# Patient Record
Sex: MALE | Race: BLACK OR AFRICAN AMERICAN | NOT HISPANIC OR LATINO | URBAN - METROPOLITAN AREA
[De-identification: names, ages, dates, MRNs, and addresses within clinical notes are randomized per-mention and may not be internally consistent; named-entity substitution may affect disease eponyms.]

---

## 2022-01-18 ENCOUNTER — EMERGENCY (EMERGENCY)
Facility: HOSPITAL | Age: 42
LOS: 1 days | Discharge: HOME | End: 2022-01-18
Attending: STUDENT IN AN ORGANIZED HEALTH CARE EDUCATION/TRAINING PROGRAM | Admitting: STUDENT IN AN ORGANIZED HEALTH CARE EDUCATION/TRAINING PROGRAM
Payer: MEDICAID

## 2022-01-18 VITALS
OXYGEN SATURATION: 100 % | RESPIRATION RATE: 18 BRPM | DIASTOLIC BLOOD PRESSURE: 133 MMHG | HEART RATE: 139 BPM | TEMPERATURE: 97 F | SYSTOLIC BLOOD PRESSURE: 188 MMHG

## 2022-01-18 VITALS
HEART RATE: 104 BPM | DIASTOLIC BLOOD PRESSURE: 119 MMHG | RESPIRATION RATE: 18 BRPM | OXYGEN SATURATION: 100 % | SYSTOLIC BLOOD PRESSURE: 163 MMHG

## 2022-01-18 LAB
ALBUMIN SERPL ELPH-MCNC: 4.5 G/DL — SIGNIFICANT CHANGE UP (ref 3.3–5)
ALBUMIN SERPL ELPH-MCNC: 4.6 G/DL — SIGNIFICANT CHANGE UP (ref 3.3–5)
ALP SERPL-CCNC: 91 U/L — SIGNIFICANT CHANGE UP (ref 40–120)
ALP SERPL-CCNC: 95 U/L — SIGNIFICANT CHANGE UP (ref 40–120)
ALT FLD-CCNC: 15 U/L — SIGNIFICANT CHANGE UP (ref 4–41)
ALT FLD-CCNC: 16 U/L — SIGNIFICANT CHANGE UP (ref 4–41)
ANION GAP SERPL CALC-SCNC: 10 MMOL/L — SIGNIFICANT CHANGE UP (ref 7–14)
ANION GAP SERPL CALC-SCNC: 12 MMOL/L — SIGNIFICANT CHANGE UP (ref 7–14)
ANION GAP SERPL CALC-SCNC: 8 MMOL/L — SIGNIFICANT CHANGE UP (ref 7–14)
AST SERPL-CCNC: 12 U/L — SIGNIFICANT CHANGE UP (ref 4–40)
AST SERPL-CCNC: 13 U/L — SIGNIFICANT CHANGE UP (ref 4–40)
BASOPHILS # BLD AUTO: 0.02 K/UL — SIGNIFICANT CHANGE UP (ref 0–0.2)
BASOPHILS NFR BLD AUTO: 0.4 % — SIGNIFICANT CHANGE UP (ref 0–2)
BILIRUB SERPL-MCNC: 0.7 MG/DL — SIGNIFICANT CHANGE UP (ref 0.2–1.2)
BILIRUB SERPL-MCNC: 0.8 MG/DL — SIGNIFICANT CHANGE UP (ref 0.2–1.2)
BUN SERPL-MCNC: 11 MG/DL — SIGNIFICANT CHANGE UP (ref 7–23)
BUN SERPL-MCNC: 11 MG/DL — SIGNIFICANT CHANGE UP (ref 7–23)
BUN SERPL-MCNC: 12 MG/DL — SIGNIFICANT CHANGE UP (ref 7–23)
CALCIUM SERPL-MCNC: 8.9 MG/DL — SIGNIFICANT CHANGE UP (ref 8.4–10.5)
CALCIUM SERPL-MCNC: 9.1 MG/DL — SIGNIFICANT CHANGE UP (ref 8.4–10.5)
CALCIUM SERPL-MCNC: 9.3 MG/DL — SIGNIFICANT CHANGE UP (ref 8.4–10.5)
CHLORIDE SERPL-SCNC: 100 MMOL/L — SIGNIFICANT CHANGE UP (ref 98–107)
CHLORIDE SERPL-SCNC: 97 MMOL/L — LOW (ref 98–107)
CHLORIDE SERPL-SCNC: 98 MMOL/L — SIGNIFICANT CHANGE UP (ref 98–107)
CO2 SERPL-SCNC: 27 MMOL/L — SIGNIFICANT CHANGE UP (ref 22–31)
CO2 SERPL-SCNC: 28 MMOL/L — SIGNIFICANT CHANGE UP (ref 22–31)
CO2 SERPL-SCNC: 30 MMOL/L — SIGNIFICANT CHANGE UP (ref 22–31)
CREAT SERPL-MCNC: 0.99 MG/DL — SIGNIFICANT CHANGE UP (ref 0.5–1.3)
CREAT SERPL-MCNC: 1.01 MG/DL — SIGNIFICANT CHANGE UP (ref 0.5–1.3)
CREAT SERPL-MCNC: 1.07 MG/DL — SIGNIFICANT CHANGE UP (ref 0.5–1.3)
EOSINOPHIL # BLD AUTO: 0.02 K/UL — SIGNIFICANT CHANGE UP (ref 0–0.5)
EOSINOPHIL NFR BLD AUTO: 0.4 % — SIGNIFICANT CHANGE UP (ref 0–6)
GLUCOSE SERPL-MCNC: 196 MG/DL — HIGH (ref 70–99)
GLUCOSE SERPL-MCNC: 224 MG/DL — HIGH (ref 70–99)
GLUCOSE SERPL-MCNC: 248 MG/DL — HIGH (ref 70–99)
HCT VFR BLD CALC: 45.7 % — SIGNIFICANT CHANGE UP (ref 39–50)
HGB BLD-MCNC: 15.5 G/DL — SIGNIFICANT CHANGE UP (ref 13–17)
IANC: 2.54 K/UL — SIGNIFICANT CHANGE UP (ref 1.5–8.5)
IMM GRANULOCYTES NFR BLD AUTO: 0.2 % — SIGNIFICANT CHANGE UP (ref 0–1.5)
LYMPHOCYTES # BLD AUTO: 2.24 K/UL — SIGNIFICANT CHANGE UP (ref 1–3.3)
LYMPHOCYTES # BLD AUTO: 43.4 % — SIGNIFICANT CHANGE UP (ref 13–44)
MAGNESIUM SERPL-MCNC: 1.7 MG/DL — SIGNIFICANT CHANGE UP (ref 1.6–2.6)
MAGNESIUM SERPL-MCNC: 2.1 MG/DL — SIGNIFICANT CHANGE UP (ref 1.6–2.6)
MCHC RBC-ENTMCNC: 29.8 PG — SIGNIFICANT CHANGE UP (ref 27–34)
MCHC RBC-ENTMCNC: 33.9 GM/DL — SIGNIFICANT CHANGE UP (ref 32–36)
MCV RBC AUTO: 87.9 FL — SIGNIFICANT CHANGE UP (ref 80–100)
MONOCYTES # BLD AUTO: 0.33 K/UL — SIGNIFICANT CHANGE UP (ref 0–0.9)
MONOCYTES NFR BLD AUTO: 6.4 % — SIGNIFICANT CHANGE UP (ref 2–14)
NEUTROPHILS # BLD AUTO: 2.54 K/UL — SIGNIFICANT CHANGE UP (ref 1.8–7.4)
NEUTROPHILS NFR BLD AUTO: 49.2 % — SIGNIFICANT CHANGE UP (ref 43–77)
NRBC # BLD: 0 /100 WBCS — SIGNIFICANT CHANGE UP
NRBC # FLD: 0 K/UL — SIGNIFICANT CHANGE UP
PHOSPHATE SERPL-MCNC: 2.1 MG/DL — LOW (ref 2.5–4.5)
PHOSPHATE SERPL-MCNC: 2.5 MG/DL — SIGNIFICANT CHANGE UP (ref 2.5–4.5)
PLATELET # BLD AUTO: 236 K/UL — SIGNIFICANT CHANGE UP (ref 150–400)
POTASSIUM SERPL-MCNC: 2.7 MMOL/L — CRITICAL LOW (ref 3.5–5.3)
POTASSIUM SERPL-MCNC: 2.7 MMOL/L — CRITICAL LOW (ref 3.5–5.3)
POTASSIUM SERPL-MCNC: 3.1 MMOL/L — LOW (ref 3.5–5.3)
POTASSIUM SERPL-SCNC: 2.7 MMOL/L — CRITICAL LOW (ref 3.5–5.3)
POTASSIUM SERPL-SCNC: 2.7 MMOL/L — CRITICAL LOW (ref 3.5–5.3)
POTASSIUM SERPL-SCNC: 3.1 MMOL/L — LOW (ref 3.5–5.3)
PROT SERPL-MCNC: 7.3 G/DL — SIGNIFICANT CHANGE UP (ref 6–8.3)
PROT SERPL-MCNC: 7.6 G/DL — SIGNIFICANT CHANGE UP (ref 6–8.3)
RBC # BLD: 5.2 M/UL — SIGNIFICANT CHANGE UP (ref 4.2–5.8)
RBC # FLD: 12 % — SIGNIFICANT CHANGE UP (ref 10.3–14.5)
SARS-COV-2 RNA SPEC QL NAA+PROBE: SIGNIFICANT CHANGE UP
SODIUM SERPL-SCNC: 136 MMOL/L — SIGNIFICANT CHANGE UP (ref 135–145)
SODIUM SERPL-SCNC: 136 MMOL/L — SIGNIFICANT CHANGE UP (ref 135–145)
SODIUM SERPL-SCNC: 138 MMOL/L — SIGNIFICANT CHANGE UP (ref 135–145)
WBC # BLD: 5.16 K/UL — SIGNIFICANT CHANGE UP (ref 3.8–10.5)
WBC # FLD AUTO: 5.16 K/UL — SIGNIFICANT CHANGE UP (ref 3.8–10.5)

## 2022-01-18 PROCEDURE — 99284 EMERGENCY DEPT VISIT MOD MDM: CPT

## 2022-01-18 RX ORDER — LOSARTAN POTASSIUM 100 MG/1
100 TABLET, FILM COATED ORAL ONCE
Refills: 0 | Status: COMPLETED | OUTPATIENT
Start: 2022-01-18 | End: 2022-01-18

## 2022-01-18 RX ORDER — POTASSIUM CHLORIDE 20 MEQ
40 PACKET (EA) ORAL ONCE
Refills: 0 | Status: COMPLETED | OUTPATIENT
Start: 2022-01-18 | End: 2022-01-18

## 2022-01-18 RX ORDER — POTASSIUM CHLORIDE 20 MEQ
10 PACKET (EA) ORAL ONCE
Refills: 0 | Status: COMPLETED | OUTPATIENT
Start: 2022-01-18 | End: 2022-01-18

## 2022-01-18 RX ORDER — MAGNESIUM SULFATE 500 MG/ML
2 VIAL (ML) INJECTION ONCE
Refills: 0 | Status: COMPLETED | OUTPATIENT
Start: 2022-01-18 | End: 2022-01-18

## 2022-01-18 RX ORDER — LOSARTAN POTASSIUM 100 MG/1
1 TABLET, FILM COATED ORAL
Qty: 30 | Refills: 0
Start: 2022-01-18 | End: 2022-02-16

## 2022-01-18 RX ADMIN — Medication 100 MILLIEQUIVALENT(S): at 15:13

## 2022-01-18 RX ADMIN — Medication 100 MILLIEQUIVALENT(S): at 12:38

## 2022-01-18 RX ADMIN — LOSARTAN POTASSIUM 100 MILLIGRAM(S): 100 TABLET, FILM COATED ORAL at 13:04

## 2022-01-18 RX ADMIN — Medication 100 MILLIEQUIVALENT(S): at 14:02

## 2022-01-18 RX ADMIN — Medication 25 GRAM(S): at 11:48

## 2022-01-18 RX ADMIN — Medication 40 MILLIEQUIVALENT(S): at 19:04

## 2022-01-18 RX ADMIN — Medication 40 MILLIEQUIVALENT(S): at 11:47

## 2022-01-18 NOTE — ED ADULT TRIAGE NOTE - CHIEF COMPLAINT QUOTE
states" MY blood pressure is very high and My potassium is low 2,8  and the level was drawn on Jan 13. denies any CP/SOB. tachycardic and hypertensive in triage. h/o HTN

## 2022-01-18 NOTE — ED PROVIDER NOTE - ATTENDING CONTRIBUTION TO CARE
Alden Lerma DO:  patient seen and evaluated with the resident.  I was present for key portions of the History & Physical, and I agree with the Impression & Plan. 40 yo m pmh htn, unk meds possible HCTZ, pw hypokalemia. had routine labs drawn, showing K 2.8. told by pcp to get iv K. pt denies all acute sx. arrives hds, triage vitals appear elevated however pt appears well in main ed. will repeat vitals, check labs, reassess.

## 2022-01-18 NOTE — ED PROVIDER NOTE - CARE PLAN
1 Principal Discharge DX:	Hypokalemia  Secondary Diagnosis:	Elevated blood pressure reading with diagnosis of hypertension

## 2022-01-18 NOTE — ED PROVIDER NOTE - NSFOLLOWUPINSTRUCTIONS_ED_ALL_ED_FT
Rest and stay well hydrated.    Stop taking hydrochlorothiazide and start taking losartan.    Follow up with your primary care physician in 1-3 days.    Return immediately with any new or worsening symptoms including severe pain, chest pain, shortness of breath, fainting, palpitations, numbness, weakness, or any additional concerns.

## 2022-01-18 NOTE — ED ADULT NURSE NOTE - OBJECTIVE STATEMENT
Pt received to room Tr B complaining of elevated BP and a low potassium of 2.8 Pt denies chest pain, sob, n/v/d, fever or chills. IV access obtained, labs drawn and sent.

## 2022-01-18 NOTE — ED PROVIDER NOTE - PROGRESS NOTE DETAILS
Dimitri, PGY-3: spoke to PCP ernie who agrees with plan, requests d/c HCTZ and start losartan 100. Estefania-PGY3: pt received at sign-out, seen and evaluated at bedside.  Pt sitting comfortably in NAD. Potassium and BP improved. No complaints. Will DC with PMD follow up and return precautions. Pt understood and agreeable with plan.

## 2022-01-18 NOTE — ED ADULT NURSE REASSESSMENT NOTE - NS ED NURSE REASSESS COMMENT FT1
Float RN: Pts vitals as noted. MD aware. pt denies chest pain, sob, headache. Pt medicated as ordered, will continue to monitor.
patient at baseline mental status, received from Day LATHA Menchaca around 1200. VS as charted, patient asymptomatic, MD Lerma and MD Mosley made aware. patient denies any headaches or blurry vision. states he feels "normal". All safety maintained.

## 2022-01-18 NOTE — ED PROVIDER NOTE - PHYSICAL EXAMINATION
[Const] well-appearing, resting comfortably, no acute distress  [HEENT] PERRL, EOMI, moist mucus membranes  [Neck] Supple, trachea midline  [CV] +S1/S2, no m/r/g appreciated  [Lungs] Clear to auscultations bilaterally, no adventitious lung sounds  [Abd] soft, non-tender, nondistended in all 4 quadrants  [MSK] 5/5 upper extremity and lower extremity str bilaterally  [Skin] warm, dry, well-perfused  [Neuro] A&Ox3, gait intact

## 2022-01-18 NOTE — ED PROVIDER NOTE - PRINCIPAL DIAGNOSIS
Action 09/08/2021 JTV 10:15am   Action Taken CSS called [facility], spoke to Padmini, and confirmed that the patient's records with [facility] will be faxed over to [phone]. Update Nurse El once records are received.     @11:09am, CSS received records form [facility]. Records sent to Nurse El and Scanning for processing     Action 09/15/2021 JTV 4:04pm   Action Taken CSS called [facility] for records with Dr. Loo to be sent over. Once records are received, notify Nurse El.     @3:24pm, CSS received records from [facility]. Records sent to scanning and a copy was sent to Nurse El to review.         Hypokalemia

## 2022-01-18 NOTE — ED PROVIDER NOTE - PATIENT PORTAL LINK FT
You can access the FollowMyHealth Patient Portal offered by [facility] by registering at the following website: [email or web address]. By joining [email or web address]’s FollowMyHealth portal, you will also be able to view your health information using other applications (apps) compatible with our system.

## 2022-01-18 NOTE — ED PROVIDER NOTE - OBJECTIVE STATEMENT
42 yo m pmh htn, unk meds possible HCTZ, pw hypokalemia. had routine labs drawn, showing K 2.8. told by pcp to get iv K. pt denies all acute sx. denies muscle cramp, weakness, diarrhea, nausea/vomiting.

## 2022-01-18 NOTE — ED PROVIDER NOTE - CLINICAL SUMMARY MEDICAL DECISION MAKING FREE TEXT BOX
40 yo m pmh htn, unk meds possible HCTZ, pw hypokalemia. had routine labs drawn, showing K 2.8. told by pcp to get iv K. pt denies all acute sx. arrives hds, triage vitals appear elevated however pt appears well in main ed. will repeat vitals, check labs, reassess.    Discussed case with PCP who requests d/c HCTZ, start losartan, give dose here, outpatient f/u after repeat BMP.

## 2022-02-16 PROBLEM — Z00.00 ENCOUNTER FOR PREVENTIVE HEALTH EXAMINATION: Status: ACTIVE | Noted: 2022-02-16

## 2022-02-23 ENCOUNTER — APPOINTMENT (OUTPATIENT)
Dept: CARDIOLOGY | Facility: HOSPITAL | Age: 42
End: 2022-02-23

## 2022-02-23 ENCOUNTER — NON-APPOINTMENT (OUTPATIENT)
Age: 42
End: 2022-02-23

## 2022-02-23 VITALS — DIASTOLIC BLOOD PRESSURE: 159 MMHG | SYSTOLIC BLOOD PRESSURE: 217 MMHG

## 2022-02-23 VITALS
HEIGHT: 71 IN | DIASTOLIC BLOOD PRESSURE: 148 MMHG | OXYGEN SATURATION: 100 % | SYSTOLIC BLOOD PRESSURE: 218 MMHG | BODY MASS INDEX: 33.74 KG/M2 | HEART RATE: 100 BPM | RESPIRATION RATE: 16 BRPM | WEIGHT: 241 LBS

## 2022-02-23 DIAGNOSIS — R94.31 ABNORMAL ELECTROCARDIOGRAM [ECG] [EKG]: ICD-10-CM

## 2022-02-23 DIAGNOSIS — I10 ESSENTIAL (PRIMARY) HYPERTENSION: ICD-10-CM

## 2022-02-23 DIAGNOSIS — Z86.79 PERSONAL HISTORY OF OTHER DISEASES OF THE CIRCULATORY SYSTEM: ICD-10-CM

## 2022-02-23 DIAGNOSIS — E87.6 HYPOKALEMIA: ICD-10-CM

## 2022-02-23 DIAGNOSIS — Z83.3 FAMILY HISTORY OF DIABETES MELLITUS: ICD-10-CM

## 2022-02-23 DIAGNOSIS — Z82.49 FAMILY HISTORY OF ISCHEMIC HEART DISEASE AND OTHER DISEASES OF THE CIRCULATORY SYSTEM: ICD-10-CM

## 2022-02-23 DIAGNOSIS — Z78.9 OTHER SPECIFIED HEALTH STATUS: ICD-10-CM

## 2022-02-23 LAB
ALBUMIN SERPL ELPH-MCNC: 4.6 G/DL — SIGNIFICANT CHANGE UP (ref 3.3–5)
ALP SERPL-CCNC: 93 U/L — SIGNIFICANT CHANGE UP (ref 40–120)
ALT FLD-CCNC: 16 U/L — SIGNIFICANT CHANGE UP (ref 4–41)
ANION GAP SERPL CALC-SCNC: 16 MMOL/L — HIGH (ref 7–14)
AST SERPL-CCNC: 11 U/L — SIGNIFICANT CHANGE UP (ref 4–40)
BILIRUB SERPL-MCNC: 0.8 MG/DL — SIGNIFICANT CHANGE UP (ref 0.2–1.2)
BUN SERPL-MCNC: 11 MG/DL — SIGNIFICANT CHANGE UP (ref 7–23)
CALCIUM SERPL-MCNC: 9.7 MG/DL — SIGNIFICANT CHANGE UP (ref 8.4–10.5)
CHLORIDE SERPL-SCNC: 98 MMOL/L — SIGNIFICANT CHANGE UP (ref 98–107)
CO2 SERPL-SCNC: 26 MMOL/L — SIGNIFICANT CHANGE UP (ref 22–31)
CREAT SERPL-MCNC: 1.08 MG/DL — SIGNIFICANT CHANGE UP (ref 0.5–1.3)
GLUCOSE SERPL-MCNC: 186 MG/DL — HIGH (ref 70–99)
MAGNESIUM SERPL-MCNC: 2 MG/DL — SIGNIFICANT CHANGE UP (ref 1.6–2.6)
POTASSIUM SERPL-MCNC: 3 MMOL/L — LOW (ref 3.5–5.3)
POTASSIUM SERPL-SCNC: 3 MMOL/L — LOW (ref 3.5–5.3)
PROT SERPL-MCNC: 7.8 G/DL — SIGNIFICANT CHANGE UP (ref 6–8.3)
SODIUM SERPL-SCNC: 140 MMOL/L — SIGNIFICANT CHANGE UP (ref 135–145)
TSH SERPL-MCNC: 2.4 UIU/ML — SIGNIFICANT CHANGE UP (ref 0.27–4.2)

## 2022-02-23 RX ORDER — AMLODIPINE BESYLATE 10 MG/1
10 TABLET ORAL DAILY
Qty: 90 | Refills: 1 | Status: ACTIVE | COMMUNITY
Start: 2022-02-23 | End: 1900-01-01

## 2022-02-23 RX ORDER — LOSARTAN POTASSIUM 100 MG/1
100 TABLET, FILM COATED ORAL DAILY
Refills: 0 | Status: ACTIVE | COMMUNITY

## 2022-02-23 RX ORDER — LOSARTAN POTASSIUM 100 MG/1
100 TABLET, FILM COATED ORAL DAILY
Qty: 1 | Refills: 1 | Status: ACTIVE | COMMUNITY
Start: 2022-02-23 | End: 1900-01-01

## 2022-02-23 NOTE — HISTORY OF PRESENT ILLNESS
[FreeTextEntry1] : 41M with PMH on HTN on HCTZ who presented to [facility] on Jan18 with K 2.8, found to have /33. Was discharged from the ED on Losartan (HCTZ was d/c'd). Patient now presents as new patient due to HTN and abnormal ECG. Today, patient asymptomatic. Denies any headache, dizziness, vision change, CP, SOB, WHITLEY, abdominal pain, nausea. Denies family history of CVD. Denies tobacco, etoh, illicit drug use. Notes that he has been taking HCTZ since 2018 prior to his admission. \par \par /159 R arm, 218/148 L arm. Repeat 188/150.

## 2022-02-23 NOTE — END OF VISIT
[] : Fellow [FreeTextEntry3] : The patient is seen for hypertension (both systolic and diastolic) with hypokalemia exacerbated by thiazide diuretic. ECG is abnormal, suggestive of left ventricular hypertrophy. The patient will be screened for primary hyperaldo with plasma aldosterone/renin ratio and an echocardiogram will be obtained.

## 2022-02-23 NOTE — DISCUSSION/SUMMARY
[FreeTextEntry1] : 41M who presents after recent ED visit found him to be in HTN urgency, hypokalemic and with an abnormal ECG. \par \par #HTN\par -/159 R arm, 218/148 L arm. Repeat 188/150.\par -Will refill Losartan 100mg QD. Will add Amlodipine 10mg QD. \par -Will obtain CMP, Mg, TSH, renin/aldosterone ration\par \par #Abnormal ECG\par -ECG concerning for LAE and LVH in setting of profound HTN\par -Will obtain ECG to assess for any structural/functional abnormality \par \par #History of Hypokalemia \par -Will obtain CMP/Mg today\par \par F/U IN 2 WEEKS

## 2022-02-24 LAB — ALDOST SERPL-MCNC: 19.8 NG/DL — SIGNIFICANT CHANGE UP

## 2022-03-03 LAB — RENIN PLAS-CCNC: 0.38 NG/ML/HR — SIGNIFICANT CHANGE UP (ref 0.17–5.38)

## 2022-03-16 ENCOUNTER — APPOINTMENT (OUTPATIENT)
Dept: CARDIOLOGY | Facility: HOSPITAL | Age: 42
End: 2022-03-16

## 2022-04-04 NOTE — ED ADULT NURSE NOTE - DATE OF LAST VACCINATION
22-May-2021 Terbinafine Pregnancy And Lactation Text: This medication is Pregnancy Category B and is considered safe during pregnancy. It is also excreted in breast milk and breast feeding isn't recommended.

## 2024-07-17 ENCOUNTER — APPOINTMENT (OUTPATIENT)
Dept: CARDIOLOGY | Facility: HOSPITAL | Age: 44
End: 2024-07-17

## 2024-07-17 ENCOUNTER — NON-APPOINTMENT (OUTPATIENT)
Age: 44
End: 2024-07-17

## 2024-07-17 VITALS — HEART RATE: 94 BPM | DIASTOLIC BLOOD PRESSURE: 122 MMHG | SYSTOLIC BLOOD PRESSURE: 210 MMHG | OXYGEN SATURATION: 99 %

## 2024-07-17 VITALS — OXYGEN SATURATION: 100 % | HEART RATE: 93 BPM | SYSTOLIC BLOOD PRESSURE: 200 MMHG | DIASTOLIC BLOOD PRESSURE: 109 MMHG

## 2024-07-17 VITALS — DIASTOLIC BLOOD PRESSURE: 102 MMHG | OXYGEN SATURATION: 100 % | SYSTOLIC BLOOD PRESSURE: 193 MMHG | HEART RATE: 92 BPM

## 2024-07-17 VITALS
DIASTOLIC BLOOD PRESSURE: 143 MMHG | SYSTOLIC BLOOD PRESSURE: 238 MMHG | BODY MASS INDEX: 35.21 KG/M2 | HEIGHT: 71 IN | HEART RATE: 178 BPM | OXYGEN SATURATION: 97 % | WEIGHT: 251.5 LBS

## 2024-07-17 VITALS — DIASTOLIC BLOOD PRESSURE: 128 MMHG | SYSTOLIC BLOOD PRESSURE: 212 MMHG

## 2024-07-17 VITALS — HEART RATE: 92 BPM

## 2024-07-17 VITALS — DIASTOLIC BLOOD PRESSURE: 141 MMHG | SYSTOLIC BLOOD PRESSURE: 220 MMHG

## 2024-07-24 ENCOUNTER — APPOINTMENT (OUTPATIENT)
Dept: CARDIOLOGY | Facility: HOSPITAL | Age: 44
End: 2024-07-24

## 2024-07-24 ENCOUNTER — NON-APPOINTMENT (OUTPATIENT)
Age: 44
End: 2024-07-24

## 2024-07-24 VITALS — SYSTOLIC BLOOD PRESSURE: 156 MMHG | DIASTOLIC BLOOD PRESSURE: 105 MMHG

## 2024-07-24 VITALS
WEIGHT: 245 LBS | OXYGEN SATURATION: 95 % | HEIGHT: 71 IN | HEART RATE: 115 BPM | DIASTOLIC BLOOD PRESSURE: 123 MMHG | BODY MASS INDEX: 34.3 KG/M2 | SYSTOLIC BLOOD PRESSURE: 196 MMHG

## 2024-07-24 DIAGNOSIS — R73.03 PREDIABETES.: ICD-10-CM

## 2024-07-24 DIAGNOSIS — I51.7 CARDIOMEGALY: ICD-10-CM

## 2024-07-24 DIAGNOSIS — R94.31 ABNORMAL ELECTROCARDIOGRAM [ECG] [EKG]: ICD-10-CM

## 2024-07-24 DIAGNOSIS — E87.6 HYPOKALEMIA: ICD-10-CM

## 2024-07-24 DIAGNOSIS — I10 ESSENTIAL (PRIMARY) HYPERTENSION: ICD-10-CM

## 2024-07-24 DIAGNOSIS — E66.9 OBESITY, UNSPECIFIED: ICD-10-CM

## 2024-07-24 RX ORDER — SPIRONOLACTONE 25 MG/1
25 TABLET ORAL DAILY
Qty: 90 | Refills: 0 | Status: ACTIVE | COMMUNITY
Start: 2024-07-24 | End: 1900-01-01

## 2024-07-24 RX ORDER — SPIRONOLACTONE 25 MG/1
25 TABLET ORAL
Qty: 45 | Refills: 0 | Status: DISCONTINUED | COMMUNITY
Start: 2024-07-24 | End: 2024-07-24

## 2024-07-25 DIAGNOSIS — N18.9 CHRONIC KIDNEY DISEASE, UNSPECIFIED: ICD-10-CM

## 2024-07-25 DIAGNOSIS — N18.2 CHRONIC KIDNEY DISEASE, STAGE 2 (MILD): ICD-10-CM

## 2024-07-25 DIAGNOSIS — R79.89 OTHER SPECIFIED ABNORMAL FINDINGS OF BLOOD CHEMISTRY: ICD-10-CM

## 2024-07-25 LAB
ALBUMIN SERPL ELPH-MCNC: 4.8 G/DL
ALDOSTERONE SERUM: 26.5 NG/DL
ALP BLD-CCNC: 84 U/L
ALT SERPL-CCNC: 15 U/L
ANION GAP SERPL CALC-SCNC: 22 MMOL/L
APPEARANCE: CLEAR
AST SERPL-CCNC: 21 U/L
BACTERIA: NEGATIVE /HPF
BILIRUB SERPL-MCNC: 0.7 MG/DL
BILIRUBIN URINE: NEGATIVE
BLOOD URINE: NEGATIVE
BUN SERPL-MCNC: 18 MG/DL
CALCIUM SERPL-MCNC: 9.5 MG/DL
CALCIUM SERPL-MCNC: 9.5 MG/DL
CAST: 0 /LPF
CHLORIDE SERPL-SCNC: 96 MMOL/L
CHOLEST SERPL-MCNC: 233 MG/DL
CO2 SERPL-SCNC: 22 MMOL/L
COLOR: YELLOW
CORTIS SERPL-MCNC: 16.7 UG/DL
CREAT SERPL-MCNC: 1.41 MG/DL
CREAT SPEC-SCNC: 192 MG/DL
EGFR: 64 ML/MIN/1.73M2
EPITHELIAL CELLS: 1 /HPF
ESTIMATED AVERAGE GLUCOSE: 206 MG/DL
GLUCOSE QUALITATIVE U: >=1000 MG/DL
GLUCOSE SERPL-MCNC: 80 MG/DL
HBA1C MFR BLD HPLC: 8.8 %
HDLC SERPL-MCNC: 48 MG/DL
KETONES URINE: 15 MG/DL
LDLC SERPL CALC-MCNC: 171 MG/DL
LEUKOCYTE ESTERASE URINE: NEGATIVE
MICROALBUMIN 24H UR DL<=1MG/L-MCNC: 5.5 MG/DL
MICROALBUMIN/CREAT 24H UR-RTO: 29 MG/G
MICROSCOPIC-UA: NORMAL
NITRITE URINE: NEGATIVE
NONHDLC SERPL-MCNC: 184 MG/DL
NT-PROBNP SERPL-MCNC: 153 PG/ML
PARATHYROID HORMONE INTACT: 82 PG/ML
PH URINE: 5.5
POTASSIUM SERPL-SCNC: 2.7 MMOL/L
PROT SERPL-MCNC: 7.8 G/DL
PROTEIN URINE: NORMAL MG/DL
RED BLOOD CELLS URINE: 0 /HPF
SODIUM ?TM SUB UR QN: <20 MMOL/L
SODIUM SERPL-SCNC: 140 MMOL/L
SPECIFIC GRAVITY URINE: >1.03
T4 FREE SERPL-MCNC: 1.5 NG/DL
TRIGL SERPL-MCNC: 78 MG/DL
TSH SERPL-ACNC: 1.14 UIU/ML
URATE SERPL-MCNC: 6 MG/DL
UROBILINOGEN URINE: 1 MG/DL
UUN UR-MCNC: 837 MG/DL
WHITE BLOOD CELLS URINE: 2 /HPF

## 2024-07-25 RX ORDER — POTASSIUM CHLORIDE 750 MG/1
10 TABLET, FILM COATED, EXTENDED RELEASE ORAL
Qty: 30 | Refills: 0 | Status: ACTIVE | COMMUNITY
Start: 2024-07-25 | End: 1900-01-01

## 2024-07-25 NOTE — ASSESSMENT
[FreeTextEntry1] : 44 year old Male with severe uncontrolled HTN, pre-DM who comes in for follow up.   #Severe Uncontrolled Asymptomatic HTN #LVH - patient currently asymptomatic, BP has improved to 150s systolic on nifedipine 60 mg daily. Discussed with him at length regarding importance of compliance and concern that this could be primary hyperaldosteronism. He was just referred by his PCP for endocrinology and I discussed with him that we will need to get blood work today to expedite this process. Following blood work, will start him on Spironolactone 25 mg daily to help with hypokalemia.  - pending UA, urine sodium/nitrogen, plasma metanephrines, TSH/free T4, cortisol AM level, CMP, aldosterone serum, renin serum, albumin/creatinine ratio, a1c, lipid profile, PTH, serum pro-BNP - counseled patient on signs of ischemic stroke and urged him to immediately call EMS if any symptoms.   #Pre-DM - will obtain A1C as above to help in decision on statin initiation

## 2024-07-25 NOTE — END OF VISIT
[FreeTextEntry3] : To assess renin aldosterone ratio and initiate therapy for uncontrolled hypertension, add spironolactone 25 mg daily. Check echo for LVH.

## 2024-07-25 NOTE — HISTORY OF PRESENT ILLNESS
[FreeTextEntry1] : 44 year old Male with severe uncontrolled HTN, pre-DM who comes in for 1 week follow up.  24 first visit with me, discussed the below:   Diagnosed with HTN back in  by Dr. Walker, his PCP who he occasionally sees. He was placed on HCTZ but ended up with K 2.8 and so was admitted to the hospital in 2018 due to hypokalemia. HCTZ was subsequently stopped. He was then ordered for losartan which he took most of the time but was not fully compliant. In  was seen here in this clinic, found to have severe HTN with systolics in 220s, ECG showed LVH and LAE, and amlodipine 10 mg was added with plan for close follow up. Patient did not return, stating he didn't know about the visit. He then self-d/c both medications for unexplained reasons.   More recently, he reportedly was noted to have pre-DM by Dr. Walker and was started on metformin however self-dc due to GI upset in the past year. He just saw PCP Dr. Walker early 2024, was prescribed Nifedipine 60 mg but had not taken it yet before my initial visit. Of note, denies any snoring or daytime fatigue.  Since initial visit last week, he has been taking his nifedipine daily without missing doses. He also saw Dr. Walker who recommended that he continue to follow with me in cardiology clinic. She provided him with his recent labs tests which were a CBC WNL, BMP showing glucose 120s. Most notably, K was low at 3.2 in the absence of any diuretic medications and normal renal function. He has not obtained the blood work ordered last week and states he is traveling tomorrow for a week. He will try and get blood tests today after clinic.   Family History: mother with HTN, Father  from COVID at age of 70. No hx of MI, SCD, or stroke. 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 Lives with mom.  Exercise: lifts weights and does cardio, three times a week. Cardio in the form of bike. Does not take any supplements. Diet: mostly eats out, lots of [place] food.  Pharmacy: [facility].

## 2024-07-25 NOTE — PHYSICAL EXAM
[Well Developed] : well developed [No Acute Distress] : no acute distress [Obese] : obese [Normal Conjunctiva] : normal conjunctiva [Normal Venous Pressure] : normal venous pressure [Clear Lung Fields] : clear lung fields [No Edema] : no edema [Normal Radial B/L] : normal radial B/L [Normal] : moves all extremities, no focal deficits, normal speech [Alert and Oriented] : alert and oriented [Normal memory] : normal memory [de-identified] : No carotid bruits [de-identified] : S1, S2, and S4 present. No murmurs. PMI is sustained and prominent.

## 2024-07-25 NOTE — PHYSICAL EXAM
[Well Developed] : well developed [No Acute Distress] : no acute distress [Obese] : obese [Normal Conjunctiva] : normal conjunctiva [Normal Venous Pressure] : normal venous pressure [Clear Lung Fields] : clear lung fields [No Edema] : no edema [Normal Radial B/L] : normal radial B/L [Normal] : moves all extremities, no focal deficits, normal speech [Alert and Oriented] : alert and oriented [Normal memory] : normal memory [de-identified] : No carotid bruits [de-identified] : S1, S2, and S4 present. No murmurs. PMI is sustained and prominent.

## 2024-07-25 NOTE — HISTORY OF PRESENT ILLNESS
[FreeTextEntry1] : 44 year old Male with severe uncontrolled HTN, pre-DM who comes in for 1 week follow up.  24 first visit with me, discussed the below:   Diagnosed with HTN back in  by Dr. Walker, his PCP who he occasionally sees. He was placed on HCTZ but ended up with K 2.8 and so was admitted to the hospital in 2018 due to hypokalemia. HCTZ was subsequently stopped. He was then ordered for losartan which he took most of the time but was not fully compliant. In  was seen here in this clinic, found to have severe HTN with systolics in 220s, ECG showed LVH and LAE, and amlodipine 10 mg was added with plan for close follow up. Patient did not return, stating he didn't know about the visit. He then self-d/c both medications for unexplained reasons.   More recently, he reportedly was noted to have pre-DM by Dr. Walker and was started on metformin however self-dc due to GI upset in the past year. He just saw PCP Dr. Walker early 2024, was prescribed Nifedipine 60 mg but had not taken it yet before my initial visit. Of note, denies any snoring or daytime fatigue.  Since initial visit last week, he has been taking his nifedipine daily without missing doses. He also saw Dr. Walker who recommended that he continue to follow with me in cardiology clinic. She provided him with his recent labs tests which were a CBC WNL, BMP showing glucose 120s. Most notably, K was low at 3.2 in the absence of any diuretic medications and normal renal function. He has not obtained the blood work ordered last week and states he is traveling tomorrow for a week. He will try and get blood tests today after clinic.   Family History: mother with HTN, Father  from COVID at age of 70. No hx of MI, SCD, or stroke. 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 Lives with mom.  Exercise: lifts weights and does cardio, three times a week. Cardio in the form of bike. Does not take any supplements. Diet: mostly eats out, lots of [place] food.  Pharmacy: [facility].

## 2024-08-06 ENCOUNTER — RESULT CHARGE (OUTPATIENT)
Age: 44
End: 2024-08-06

## 2024-08-07 ENCOUNTER — APPOINTMENT (OUTPATIENT)
Dept: CARDIOLOGY | Facility: HOSPITAL | Age: 44
End: 2024-08-07

## 2024-08-07 ENCOUNTER — NON-APPOINTMENT (OUTPATIENT)
Age: 44
End: 2024-08-07

## 2024-08-07 PROBLEM — E11.9 TYPE 2 DIABETES MELLITUS WITHOUT COMPLICATION, UNSPECIFIED WHETHER LONG TERM INSULIN USE: Status: ACTIVE | Noted: 2024-08-07

## 2024-08-07 PROBLEM — E66.9 OBESITY: Noted: 2024-07-17

## 2024-08-07 PROBLEM — N18.9 CKD (CHRONIC KIDNEY DISEASE): Noted: 2024-07-25

## 2024-08-07 PROBLEM — I10 HYPERTENSION WITH ALBUMINURIA: Status: ACTIVE | Noted: 2024-08-07

## 2024-08-07 PROBLEM — Z71.3 DIETARY COUNSELING AND SURVEILLANCE: Status: ACTIVE | Noted: 2024-08-07

## 2024-08-07 PROBLEM — E78.5 HLD (HYPERLIPIDEMIA): Status: ACTIVE | Noted: 2024-08-07

## 2024-08-07 PROBLEM — E66.9 OBESITY, CLASS I, BMI 30-34.9: Status: ACTIVE | Noted: 2024-08-07

## 2024-08-07 PROBLEM — N18.2 STAGE 2 CHRONIC KIDNEY DISEASE: Noted: 2024-07-25

## 2024-08-07 PROBLEM — R73.03 PRE-DIABETES: Noted: 2024-07-17

## 2024-08-07 PROBLEM — N18.31 STAGE 3A CHRONIC KIDNEY DISEASE: Status: ACTIVE | Noted: 2024-08-07

## 2024-08-07 PROCEDURE — G2211 COMPLEX E/M VISIT ADD ON: CPT | Mod: NC

## 2024-08-08 NOTE — ADDENDUM
[FreeTextEntry1] : I spent 60 minutes direct face to face time with the patient, from 13:00 to 14:00 on 8/7/24.  Prior to the visit, I obtained and reviewed lab results from Dr. Walker. After the visit. automated hemodynamics were analyzed. This preparation time was 20 minutes. Thus, total visit time was 80 minutes

## 2024-08-08 NOTE — CARDIOLOGY SUMMARY
[de-identified] : 8/7/24, sinus tachyardia at 103 bpm. There was voltage criteria for LVH (R(I)+S(III) exceeds 2.50 mV) -Voltage criteria wIT ST/T abnormality In I and aVL, c/w repolarization abnormality.  Prior ECG 7/17/24 showed normal sinus rhythm at 94 bpm. There was also voltage criteria for LVH (R(I)+S(III) exceeds 2.50 mV) - ST-segment and T-wave changes in I and aVL c/w repolarization abnormality  [de-identified] : Hemodynamics:  Time		SBP	DBP	MAP	HR	O2 Sat	Position 23:39		155	99	117	105	99	Dinamap L Seated 8/7/24 23:40		162	94	117	101	100	Dinamap L Seated 8/7/24 23:40		165	94	116	97	99	Dinamap L Seated 8/7/24 23:41		167	92	117	97	98	Dinamap L Seated 8/7/24 23:42		155	92	113	100	99	Dinamap L Seated 8/7/24 23:43		143	98	110	99	100	Dinamap L Seated 8/7/24 23:43		149	96	117	99	99	Dinamap L Seated 8/7/24 23:43		157	91	114	97	99	Dinamap L Seated 8/7/24 23:46		150	84	106	91	100	Dinamap L Seated 8/7/24 23:47		133	82	100	99	100	Dinamap L Seated 8/7/24 23:48		153	89	113	95	100	Dinamap L Seated 8/7/24 23:48		145	89	110	91	100	Dinamap L Seated 8/7/24 23:49		149	103	119	100	100	Dinamap L Seated 8/7/24 23:51		146	103	114	106	99	Dinamap L Standing 8/7/24 Mean		152	93	113	98	99	Dinamap All 8/7/24 SD		9.1	6.3	5.2	4.3	0.6	Dinamap All 8/7/24 Med		151.5	93	114	99	99.5	Dinamap All 8/7/24 Max		167	103	119	106	100	Dinamap All 8/7/24 Min		133	82	100	91	98	Dinamap All 8/7/24

## 2024-08-08 NOTE — PHYSICAL EXAM
[Well Developed] : well developed [No Acute Distress] : no acute distress [Obese] : obese [Normal Conjunctiva] : normal conjunctiva [Normal Venous Pressure] : normal venous pressure [Clear Lung Fields] : clear lung fields [No Edema] : no edema [Normal Radial B/L] : normal radial B/L [Normal] : moves all extremities, no focal deficits, normal speech [Alert and Oriented] : alert and oriented [Normal memory] : normal memory [de-identified] : No carotid bruits [de-identified] : S1, S2, and S4 present. No murmurs. PMI is sustained and prominent.

## 2024-08-08 NOTE — REVIEW OF SYSTEMS
Referral made to [facility]. Continue bland diet, low salt diet. Avoid milk or milk products. Can have broth, toast, bland foods for diarrhea. If diarrhea persists over next 2 days will need get a stool sample. Can have 8 oz of gatorade per day. [Negative] : Psychiatric [Fever] : no fever [Headache] : no headache [Weight Gain (___ Lbs)] : no recent weight gain [Feeling Fatigued] : not feeling fatigued [Weight Loss (___ Lbs)] : no recent weight loss [SOB] : no shortness of breath [Chest Discomfort] : no chest discomfort [Palpitations] : no palpitations

## 2024-08-08 NOTE — END OF VISIT
[] : Fellow [Time Spent: ___ minutes] : I have spent [unfilled] minutes of time on the encounter. [FreeTextEntry3] : 44-year-old man with severe HTN, hypertensive heart disease by ECG, DM-II, CKD-!!!a with borderline microalbuminuria, secondary hyperparathyroidism and HLD. He is seen today for follow up. My comments were added directly to the body of this note.

## 2024-08-08 NOTE — HISTORY OF PRESENT ILLNESS
[FreeTextEntry1] : 44-year-old man with severe HTN, hypertensive heart disease by ECG, DM-II, CKD-IIIa with borderline microalbuminuria, secondary hyperparathyroidism and HLD, who comes in for clinic follow up  #Severe HTN 24 first visit with me, discussed the below: Diagnosed with HTN   by Dr. Walker, his PCP. HCTZ started, but complicated by serum K 2.8 mmol/L. He was admitted to the hospital in  for hypokalemia, HCTZ subsequently stopped. HCTZ replaced with losartan, but used with variable compliance.. F/u  in this clinic noted severe HTN with SBP  220s mm Hg, ECG showed LVH and LAE, suggesting hypertensive heart disease, Amlodipine 10 mg was added, but no subsequent follow-up and the patient eventually discontinued all medicaiton.   More recently, he was started on metformin for elevated A1C (pre-diabetic).  Metformin self-discontinued due to GI discomfort. At f/u with PCP, Dr. Walker early 2024, nifedipine 60 mg daily was prescribed, but started by time of initial visit here. There was no snoring, daytime fatigue or witnessed apnea.  Since initial visit, he maintained nifedipine with good compliance and no side effects. He also saw Dr. Walker obtained CBC WNL, BMP showing glucose 120s.mg/dL and serum K 3.2 mmol/L in the absence of diuretic therapy and with normal renal function.   Labs 24 noted serum  K 2.7 mmol/L. He was asymptomatic and stared spironolactone. Serum aldosterone 26.5 ng/dL (normal <=23.2). Direct plasma renin was not done.  He was also noted to have mild KANDICE with SCr 1.4 mg/dL and likely secondary hyperparathyroidism with intact PTH 92 pg/mL (normal 15 - 65) and calcium 9.5 mg/dL (normal 8.4 - 10.5). Vitamin D 25 hydroxy level was ordered and patient nstructed to follow up with endocrinology as previously referred.   Since last discussion, he traveled to [facility] on vacation. He started spironolactone 24 (two doses as of this visit).  BP improved after accounting for white coat effect, initially 170s mm Hg, but repeat 151 mm Hg. Self-obtained BP using automated cuff 131/88 mm Hg on 24.  #Newly Diagnosed DM - A1C obtained here in clinic 8.8%. His outpatient PCP just recently started him on Farxiga.   #HLD - recent LDL was noted to be 171 mg/dL. Discussed with him today, amenable to starting atorvastatin 40 mg nightly given ASCVD risk 16.9%  Family History: mother with HTN, Father  from COVID at age of 70. No hx of MI, SCD, or stroke. 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 Lives with mom.  Exercise: lifts weights and does cardio, three times a week. Cardio in the form of bike. Does not take any supplements. Diet: mostly eats out, lots of [place] food. Has made changes to eat more vegetables since initial visit.   Pharmacy: [facility].

## 2024-08-08 NOTE — CARDIOLOGY SUMMARY
[de-identified] : 8/7/24, sinus tachyardia at 103 bpm. There was voltage criteria for LVH (R(I)+S(III) exceeds 2.50 mV) -Voltage criteria wIT ST/T abnormality In I and aVL, c/w repolarization abnormality.  Prior ECG 7/17/24 showed normal sinus rhythm at 94 bpm. There was also voltage criteria for LVH (R(I)+S(III) exceeds 2.50 mV) - ST-segment and T-wave changes in I and aVL c/w repolarization abnormality  [de-identified] : Hemodynamics:  Time		SBP	DBP	MAP	HR	O2 Sat	Position 23:39		155	99	117	105	99	Dinamap L Seated 8/7/24 23:40		162	94	117	101	100	Dinamap L Seated 8/7/24 23:40		165	94	116	97	99	Dinamap L Seated 8/7/24 23:41		167	92	117	97	98	Dinamap L Seated 8/7/24 23:42		155	92	113	100	99	Dinamap L Seated 8/7/24 23:43		143	98	110	99	100	Dinamap L Seated 8/7/24 23:43		149	96	117	99	99	Dinamap L Seated 8/7/24 23:43		157	91	114	97	99	Dinamap L Seated 8/7/24 23:46		150	84	106	91	100	Dinamap L Seated 8/7/24 23:47		133	82	100	99	100	Dinamap L Seated 8/7/24 23:48		153	89	113	95	100	Dinamap L Seated 8/7/24 23:48		145	89	110	91	100	Dinamap L Seated 8/7/24 23:49		149	103	119	100	100	Dinamap L Seated 8/7/24 23:51		146	103	114	106	99	Dinamap L Standing 8/7/24 Mean		152	93	113	98	99	Dinamap All 8/7/24 SD		9.1	6.3	5.2	4.3	0.6	Dinamap All 8/7/24 Med		151.5	93	114	99	99.5	Dinamap All 8/7/24 Max		167	103	119	106	100	Dinamap All 8/7/24 Min		133	82	100	91	98	Dinamap All 8/7/24

## 2024-08-08 NOTE — PHYSICAL EXAM
[Well Developed] : well developed [No Acute Distress] : no acute distress [Obese] : obese [Normal Conjunctiva] : normal conjunctiva [Normal Venous Pressure] : normal venous pressure [Clear Lung Fields] : clear lung fields [No Edema] : no edema [Normal Radial B/L] : normal radial B/L [Normal] : moves all extremities, no focal deficits, normal speech [Alert and Oriented] : alert and oriented [Normal memory] : normal memory [de-identified] : No carotid bruits [de-identified] : S1, S2, and S4 present. No murmurs. PMI is sustained and prominent.

## 2024-08-08 NOTE — DISCUSSION/SUMMARY
[FreeTextEntry1] :  WEIGHT GOALS:  Category				BMI range - kg/m2	BMI Prime Very severely underweight		less than 15		less than 0.60	 Severely underweight			from 15.0 to 16.0		from 0.60 to 0.64	 Underweight				from 16.0 to 18.5		from 0.64 to 0.74	 Normal (healthy weight)			from 18.5 to 25		from 0.74 to 1.0	 Overweight				from 25 to 30		from 1.0 to 1.2	 Obese Class I (Moderately obese)		from 30 to 35		from 1.2 to 1.4	 Obese Class II (Severely obese)		from 35 to 40		from 1.4 to 1.6	 Obese Class III (Very severely obese)	over 40			over 1.6	  Your current weight is 245 lbs. Given current weight and height 5'11", your calculated body mass index (BMI) is 34.2 kg/sqm. Normal BMI is 18.5-25 kg/sqm. Thus, current weight is in the obese class I category.   DIETARY SALT (SODIUM); DASH DIET AND BLOOD PRESSURE: To decrease the sodium in your diet:   Use fresh vegetables and foods as much as possible.  Avoid canned and processed foods. Cured meats such as pereira, ham, and sausages are high in salt.  Try using different herbs and spices in your cooking instead of salt.  In restaurants, avoid foods with sauces, cheese, and cured meats. Ask for low-sodium choices. To get more potassium in your diet, eat:  Bananas, fresh or dried apricots, peaches, citrus fruits, melons  Cauliflower, broccoli, tomatoes, carrots, raw spinach, beet greens, potatoes To get more magnesium in your diet, eat:  Whole grain foods, leafy green vegetables, dried fruits - Fish and seafood, poultry  To get more calcium in your diet, eat:  Nonfat milk, yogurt, and low-fat cheeses   [place] and sardines  Cooked dried beans  Broccoli, kale, and bok michael  Tofu or soybean curd DASH stands for "dietary approaches to stop hypertension." The DASH diet is low in total and saturated fat. It is rich in fruits, vegetables, and low-fat dairy foods. The diet allows you to get natural fiber, calcium, and magnesium from food. It prevents or lowers high blood pressure. It can also help lower cholesterol in your blood.  Don't change how you eat all at once. It's much more likely that you'll succeed if you make only one or two small changes at a time. Wait until those changes are a habit, then make a couple more changes. Some good starting steps include:  Add one serving of vegetables to your meals at lunch and dinner. This is an easy way to help you get more vegetables in your diet.  Have a piece of fruit as an afternoon or after-dinner snack. One glass of juice at breakfast is not enough fruit in your diet.  Use half your usual amount of butter, margarine, or salad dressing.  Buy nonfat salad dressing or nonfat sour cream. Follow this guide to select your menu of meals. The number of calories we want you to eat each day will tell you how many servings you can choose from each food group. Calories: 1600 2100 2600 3100  Servings Grains 6 7  10  12   Vegetables 	 4 4  5 6 Fruit 4 5 5 6 Dairy (low-fat) 2  3 3 3   Meat, poultry, fish  1  2 2   Nuts, seeds    1 Fats and sweets 1  2  3 4 Grains and grain products like breads and cereals provide energy, fiber and vitamins. Whole grains have more of these nutrients. One serving equals one of the following: Bagel, 1/2 medium; Barley, cooked 1/2 cup; Biscuit, country style 1 medium; Bread, whole wheat, white 1 slice; Cereals, cold or cooked, 1/2 cup; Cornbread, 1 medium piece; Crackers, xiomara, 2; Crackers, saltine, 4; Dinner roll, 1medium; English muffin, ; Hamburger bun, ; Muffin, 1 medium; Pancake, 1 medium; Pasta, 1/2 cup cooked; Dominga, 1/2 large or 1 small; Popcorn, 1 cup popped; Pretzels, 1 ounce; Rice, white, brown, or wild, 1/2 cup cooked; Tortillas, corn or flour, 1 medium; Waffle, 1 medium; Wheat germ, 1/4 cup;  Vegetables are rich sources of potassium, magnesium, and fiber. One serving is 1/2 cup of any of the following cooked vegetables: Asparagus, Beans (green, yellow), Beets, Broccoli, [place] Sprouts, Carrots, Cauliflower, Temo, chicory, mustard and turnip (and other) greens, Corn, Kale, Lima beans, Mixed vegetables, Okra, Parsnips, Peas, green, Potatoes (1/2 medium or 1/2 cup mashed), Pumpkin, Rutabaga, Spaghetti or tomato sauce, Spinach, Squash (zucchini or yellow), Stewed tomatoes, Succotash, Sweet potatoes, Turnips, Yam  Raw vegetables: Carrots,1/2 cup; Celery, 1/2 cup; Lettuce (irene, loose-leaf, green-leaf), 1 cup; Peppers, 1/2 cup; Spinach, 1 cup; Tomato, 1/2 Fruits and fruit juices are important sources of potassium and magnesium. Fruits also contain fiber and are low in sodium and fat. One serving equals: Any fruit juice, # cup (6 ounces); Canned or frozen fruit,  cup (includes applesauce); Dried fruit,  cup;  Fresh fruit: Apple, 1 medium; Apricots, 2 medium; Banana, 1 medium; Berries, 1/2 cup; Melon, 1 wedge, or 1/2 cup; Cherries, 10; Grapefruit, 1/2; Grapes, 15; Kiwi, 1 medium; Iván, 1/2 small; Nectarine, 1 medium; Orange, 1 medium; Peach, 1 medium; Pear, 1 medium; Pineapple, 1/2 cup; Plums, 2 medium; Tangerine, 1 large Dairy foods provide protein and calcium. Use low-fat or nonfat dairy products to cut down on fat. One serving equals: Skim milk, 1 cup (8 ounces); 1% low fat milk, 1 cup (8 ounces); 2% low fat milk, 1 cup (8 ounces) nonfat dry milk powder (1/3 cup); Low-fat cottage cheese, 1 cup (8 ounces); Parmesan cheese, 1 tablespoon; Mozzarella cheese, part skim, 1/4 cup (1 ounce); Low-fat cheddar cheese, 11/2 ounces; Ricotta cheese, part skim milk or nonfat, 1/4 cup (11/2 ounces); Other low fat or nonfat cheeses (11/2 oz.); Low-fat or nonfat yogurt, fruit-flavored or plain, 1 cup (8 ounces) Low-fat or nonfat frozen yogurt, 1/2 cup (4 ounces); Note: People who can't digest dairy products can try taking lactase enzyme pills or drops (available at drug and grocery stores) when they eat dairy. There is also milk available with the enzyme already added. Or you can buy lactose-free milk. Meat, poultry, and fish are good sources of protein and magnesium. One serving equals: Lean meat including beef, veal, or pork, 3 ounces cooked; Skinless, white meat poultry including turkey, chicken, 3 ounces; Fish and shellfish, 3 ounces cooked; Low-fat luncheon meats, 1 ounce; Egg, 1 medium; Tofu, 3 ounces Note: Three ounces of cooked meat is about the size of a deck of cards. Nuts, seeds, and legumes are rich sources of energy, magnesium, potassium, protein and fiber. Nuts and seeds are also high in fat, so portions should be small. Almonds, 1/3 cup; Beans such as kidney, hidalgo, and navy, 1/2 cup cooked; Chickpeas and lentils, 1/2 cup cooked; Cashews, 1/3 cup; Filberts, 1/3 cup; Mixed nuts, 1/3 cup; Peanut butter, 2 tablespoons; Peanuts, 1/3 cup; Sesame seeds, 2 tablespoons; Sunflower seeds, 2 tablespoons; Tofu, regular, 3 ounces; Walnuts, 1/3 cup  Following the above diet will give you about 27% fat in your diet. The goal is to have 30% or less of the calories you eat each day be from fat. To meet that goal, do not eat more than 2-3 servings daily of added fat. Also try to limit sweets. One serving equals: Butter or margarine, 1 teaspoon; Mayonnaise, 1 teaspoon; Low-fat mayonnaise, 1 tablespoon; Salad dressing, 1 tablespoon; Low-fat salad dressing, 2 tablespoons; Oil, 1 teaspoon (use olive, canola, safflower, or other vegetable oils); Candy, hard, 3 pieces; Jelly or jam, 1 tablespoon); Jell-O, 1/2 cup; Jelly beans, 1/2 ounce; Maple syrup, 1 tablespoon; Popsicle, 1; Sherbet or nonfat or low-fat frozen yogurt, 1/2 cup; Sugar, 1 tablespoon; Sugared lemonade or fruit punch, 1 cup (8 ounces); Note: Try diet fruit-flavored gelatin or frozen, canned, or fresh fruit for dessert.  Small amounts of alcohol may have benefits to the heart and blood pressure. However, excess use of alcohol can cause damage to the brain, liver and other organs. It can lead to high blood pressure. Drinking more than two drinks (15 ml)  every day can raise your blood pressure. 15 ml of alcohol equals:  - one 12-ounce bottle of beer  - a half glass (5 ounces) of wine  - 1 ounce (one shot) of 100 proof hard liquor   GLYCEMIC INDEX: Foods can be measured by how much they are likely to raise blood sugar. This is called the glycemic index. We want you to eat mostly foods that have a low glycemic index.  Fruit: choose cherries, grapefruit, prunes, dried apricots, apples, peaches, pears, blueberries, strawberries, oranges, and grapes.   Breads and other starches: Choose pumpernickel, rye, sourdough, or stone-ground whole wheat bread. For cereal, choose bran flakes, oat flakes, and oatmeal. For rice, use long-grained white rice. Most pasta is fairly low on the glycemic index; whole wheat or egg pasta is the lowest. Choose new or sweet potatoes and yams.   Dairy products: Dairy tends to be fairly low on the glycemic index because of the protein and fat in it. Premium ice cream is lower on the glycemic index than low-fat ice cream.   Salty snacks: Hummus, peanuts, walnuts, and cashews are all good choices.   Sweets: Most sweets are high on the glycemic index, so make them special treats only. Ones that have protein and fat in them tend to be a bit lower. Milk chocolate or peanut candies are good choices. Pound and sponge cakes are lower on the glycemic index than other types of cakes. For cookies, choose peanut butter, chocolate chip, butter, and oatmeal cookies. For a breakfast treat, choose scones or oatmeal muffins.  Beans: Choose dariela, chickpeas (garbanzo beans), lentils, split peas, lima beans, and kidney beans.  Meat and vegetables are low on the glycemic index. Soups and stews made with meat or vegetables are also good.    A diabetic diet is an ideal way to eat. Because diabetics do not digest foods normally, it is especially important to eat in a way that is not stressful to the body. A diabetic diet is low in fat and sugars, and contains just the right amount of calories for your needs. It is important that eating be spread evenly throughout the day. It is better to have 5 to 6 small meals than 2 to 3 large ones. Avoid the following on a low-sugar diet:  candy  chocolates  cookies  baked goods, such as pastries  soda pop  juices, especially those that have sugar added  ice cream, ice milk, frozen yogurt, and sherbet After 48 hours on a very low-carbohydrate diet, the body uses up its stored carbohydrates (glycogen) and starts burning fat for fuel. However, a diet that contains too much saturated fat and red meat may increase your risk of heart disease or cancer. If you want to try this type of diet, we recommend doing the following: - Limit red meat, butter, cream, eggs and hard cheeses. These contain saturated fats or cholesterol. Fish, skinless chicken or turkey, and cottage cheese are better choices. Beans, soy products such as tofu, nuts, and nut butters are also good sources of protein. Make your portions small. One serving equals 3 ounces of chicken or a half-cup of cottage cheese. Try to limit your fat intake to 30 percent of your total diet. - Not all carbohydrates are bad for you. It's wise to limit how much you eat of foods made with white flour, like bread, crackers, cookies, and cake. But it's a good idea to eat plenty of green vegetables and whole grains like oats and brown rice. Choose whole-wheat or whole-grain bread and pasta instead of white bread and pasta. Eat at least four servings of whole grains and five of fruits and vegetables every day. - As much as possible, cut out sugar and sweetened foods. When you want something sweet, eat fruit. A bowl of fruit with a small scoop of non-fat vanilla ice cream on top is a great dessert and gives you some protein too. - Remember that to lose weight, you have to burn more calories than you take in. Exercise will help you burn more calories. Talk to us if you have not been exercising regularly. We can help you find an exercise plan that works for you.  Good eye care is an important part of your overall treatment. Diabetes can lead to eye problems and even blindness. Seeing an eye doctor regularly helps prevent serious eye problems. It also helps us know if your diabetes needs to be better controlled.  Do the following every day: - Keep your feet clean and dry. Wash them every day with a mild soap. Dry carefully, especially between the toes. Use a soft towel and don't rub.  - Wear clean, soft stockings. Avoid elastic, stretch socks. Don't allow your socks to bunch up inside your shoe. - Wear soft, comfortable shoes that fit you well. Do not wear shoes that are too tight or that rub your feet. - Check the bath water with your hand before you put your feet in. Be sure it is not too hot. - Treat dry skin with a lotion everywhere except between your toes. - Cut your toenails straight across.  - Do not treat ingrown toe nails, corns, or warts yourself. Let us help you. - Don't wear sandals. - Don't wear shoes without socks. - Don't soak your feet in water. - Don't use a heating pad or other heating devices on your feet. - Never go barefoot. Watch for: - Swollen or red areas (signs of possible infection) - Cuts or breaks in the skin - Cold or pale areas (sign of possible poor circulation) - Very warm areas (sign of possible infection) - A sore on your foot - A red, tender toe Call us if you see any of these problems. Early treatment of foot problems in diabetes is very important.  MEDITERRANEAN DIET: The Mediterranean diet does not limit calories. Instead, it provides guidelines for what foods you should eat more of and what foods you should eat less of.  Every day:  - Eat several servings of plant foods. These include fruits and vegetables, potatoes, breads and whole grains, beans, nuts, and seeds. As much as you can, eat fresh rather than canned or frozen foods.  - Use olive oil for cooking and salad dressings. You can have up to 35% of your calories from fats, including olive and other vegetable oils. However, limit the amount you eat of saturated fats like butter, margarine, lard, and animal fat. They should add up to no more than 8 percent of your total calories. Include "partially hydrogenated" oils in your saturated fat limit.  - Every day, have a small amount of cheese and yogurt. Look for low-fat and non-fat types. Think of cheese as a condiment like salt or pepper, not a main dish. For example, you can sprinkle a little Parmesan cheese on pasta or put a little cheese in a salad. Cheese contains saturated fat. Mozzarella cheese is much lower in fat than most types of cheese.  - If alcohol is not a problem for you, have one (for women) or two (for men) glasses of red wine with dinner. If alcohol is not a good idea for you, try adding grape juice to your diet instead. It gives you the same heart-protecting benefits of wine without alcohol. It is very sweet, so you might use it for your "dessert."  Every week:  - Eat some fish and poultry. Do not eat chicken or turkey skin.  - Eat no more than 4 eggs a week, including eggs used in cooking. Egg yolks contain saturated fat.  - Eat no more than 2 or 3 sweets or desserts that contain lots of sugar or honey. Try fresh fruit or sweets made with fruit concentrate instead.  Every month:  - Eat no more than 12 to 16 ounces of red meat. Lean meat is preferable.

## 2024-08-08 NOTE — HISTORY OF PRESENT ILLNESS
[FreeTextEntry1] : 44-year-old man with severe HTN, hypertensive heart disease by ECG, DM-II, CKD-IIIa with borderline microalbuminuria, secondary hyperparathyroidism and HLD, who comes in for clinic follow up  #Severe HTN 24 first visit with me, discussed the below: Diagnosed with HTN   by Dr. Walker, his PCP. HCTZ started, but complicated by serum K 2.8 mmol/L. He was admitted to the hospital in  for hypokalemia, HCTZ subsequently stopped. HCTZ replaced with losartan, but used with variable compliance.. F/u  in this clinic noted severe HTN with SBP  220s mm Hg, ECG showed LVH and LAE, suggesting hypertensive heart disease, Amlodipine 10 mg was added, but no subsequent follow-up and the patient eventually discontinued all medicaiton.   More recently, he was started on metformin for elevated A1C (pre-diabetic).  Metformin self-discontinued due to GI discomfort. At f/u with PCP, Dr. Walker early 2024, nifedipine 60 mg daily was prescribed, but started by time of initial visit here. There was no snoring, daytime fatigue or witnessed apnea.  Since initial visit, he maintained nifedipine with good compliance and no side effects. He also saw Dr. Walker obtained CBC WNL, BMP showing glucose 120s.mg/dL and serum K 3.2 mmol/L in the absence of diuretic therapy and with normal renal function.   Labs 24 noted serum  K 2.7 mmol/L. He was asymptomatic and stared spironolactone. Serum aldosterone 26.5 ng/dL (normal <=23.2). Direct plasma renin was not done.  He was also noted to have mild KANDICE with SCr 1.4 mg/dL and likely secondary hyperparathyroidism with intact PTH 92 pg/mL (normal 15 - 65) and calcium 9.5 mg/dL (normal 8.4 - 10.5). Vitamin D 25 hydroxy level was ordered and patient nstructed to follow up with endocrinology as previously referred.   Since last discussion, he traveled to [facility] on vacation. He started spironolactone 24 (two doses as of this visit).  BP improved after accounting for white coat effect, initially 170s mm Hg, but repeat 151 mm Hg. Self-obtained BP using automated cuff 131/88 mm Hg on 24.  #Newly Diagnosed DM - A1C obtained here in clinic 8.8%. His outpatient PCP just recently started him on Farxiga.   #HLD - recent LDL was noted to be 171 mg/dL. Discussed with him today, amenable to starting atorvastatin 40 mg nightly given ASCVD risk 16.9%  Family History: mother with HTN, Father  from COVID at age of 70. No hx of MI, SCD, or stroke. 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 Lives with mom.  Exercise: lifts weights and does cardio, three times a week. Cardio in the form of bike. Does not take any supplements. Diet: mostly eats out, lots of Aakash food. Has made changes to eat more vegetables since initial visit.   Pharmacy: [facility].

## 2024-08-08 NOTE — ASSESSMENT
[FreeTextEntry1] : 44-year-old man with severe HTN, hypertensive heart disease by ECG, DM-II, CKD-!!!a with borderline microalbuminuria, secondary hyperparathyroidism and HLD. He is seen today for follow up.  #Severe HTN with mildly elevated SCr, borderline albuminuria, type II DM by A1C and evidence of hypertensive heart by ECG.  #LVH #Concern for Primary Hyperaldosteronism - patient with elevated serum aldosterone, unknown direct plasma renin. He only just started the spironolactone yesterday and so obtaining a renin would still likely be interpretable at this time. Will send for repeat renin, renin-aldosterone ratio go ahead and obtain CT A/P to evaluate for adenoma.  Labs noted SCr 1.41 mg/dL (normal 0.5 - 1.3), urine albumin / creat 29 mg/g (normal <= 30). Fourteen serial automated (Dinamap, tm) hemodynamic measurements were obtained. Mean BP for all automated measurements was 152/93 +/- 9.1/6.3 mm Hg with mean HR 98 +/- 4.3 bpm and mean oxygen saturation 99 +/- 0.6% on room air. The lowest seated BP was 133/82 mm Hg. The last seated BP was 149/103 mm Hg with  bpm. Upon standing, BP was 146/103 mm Hg with  bpm. Given up-titration of atorvastatin and initiation of Farxiga, will not up titrate antihypertensive medication at this time as this will make assessment for potential medication side effects difficult.   #Newly Diagnosed DM - A1C obtained here in clinic 8.8%. His outpatient PCP just recently started him on Farxiga. This may help lower BP due to diuretic effect. Will monitor q3 months.   #HLD - recent LDL was noted to be 171 mg/dL. Discussed with him risks and benefits of starting atorvastatin 40 mg daily, he agrees to start today given elevated ASCVD risk 16.9%. Rx sent to pharmacy.   #Elevated PTH #Normocalcemia -  elevated PTH which could be in setting of primary hyperaldosteronism vs acutely due to mild KANDICE vs primary hyperparathyroidism. Will order vitamin D levels and told patient he needs to follow up with endocrinology as previously referred.

## 2024-08-08 NOTE — PHYSICAL EXAM
[Well Developed] : well developed [No Acute Distress] : no acute distress [Obese] : obese [Normal Conjunctiva] : normal conjunctiva [Normal Venous Pressure] : normal venous pressure [Clear Lung Fields] : clear lung fields [No Edema] : no edema [Normal Radial B/L] : normal radial B/L [Normal] : moves all extremities, no focal deficits, normal speech [Alert and Oriented] : alert and oriented [Normal memory] : normal memory [de-identified] : No carotid bruits [de-identified] : S1, S2, and S4 present. No murmurs. PMI is sustained and prominent.

## 2024-08-08 NOTE — CARDIOLOGY SUMMARY
[de-identified] : 8/7/24, sinus tachyardia at 103 bpm. There was voltage criteria for LVH (R(I)+S(III) exceeds 2.50 mV) -Voltage criteria wIT ST/T abnormality In I and aVL, c/w repolarization abnormality.  Prior ECG 7/17/24 showed normal sinus rhythm at 94 bpm. There was also voltage criteria for LVH (R(I)+S(III) exceeds 2.50 mV) - ST-segment and T-wave changes in I and aVL c/w repolarization abnormality  [de-identified] : Hemodynamics:  Time		SBP	DBP	MAP	HR	O2 Sat	Position 23:39		155	99	117	105	99	Dinamap L Seated 8/7/24 23:40		162	94	117	101	100	Dinamap L Seated 8/7/24 23:40		165	94	116	97	99	Dinamap L Seated 8/7/24 23:41		167	92	117	97	98	Dinamap L Seated 8/7/24 23:42		155	92	113	100	99	Dinamap L Seated 8/7/24 23:43		143	98	110	99	100	Dinamap L Seated 8/7/24 23:43		149	96	117	99	99	Dinamap L Seated 8/7/24 23:43		157	91	114	97	99	Dinamap L Seated 8/7/24 23:46		150	84	106	91	100	Dinamap L Seated 8/7/24 23:47		133	82	100	99	100	Dinamap L Seated 8/7/24 23:48		153	89	113	95	100	Dinamap L Seated 8/7/24 23:48		145	89	110	91	100	Dinamap L Seated 8/7/24 23:49		149	103	119	100	100	Dinamap L Seated 8/7/24 23:51		146	103	114	106	99	Dinamap L Standing 8/7/24 Mean		152	93	113	98	99	Dinamap All 8/7/24 SD		9.1	6.3	5.2	4.3	0.6	Dinamap All 8/7/24 Med		151.5	93	114	99	99.5	Dinamap All 8/7/24 Max		167	103	119	106	100	Dinamap All 8/7/24 Min		133	82	100	91	98	Dinamap All 8/7/24

## 2024-08-08 NOTE — HISTORY OF PRESENT ILLNESS
[FreeTextEntry1] : 44-year-old man with severe HTN, hypertensive heart disease by ECG, DM-II, CKD-IIIa with borderline microalbuminuria, secondary hyperparathyroidism and HLD, who comes in for clinic follow up  #Severe HTN 24 first visit with me, discussed the below: Diagnosed with HTN   by Dr. Walker, his PCP. HCTZ started, but complicated by serum K 2.8 mmol/L. He was admitted to the hospital in  for hypokalemia, HCTZ subsequently stopped. HCTZ replaced with losartan, but used with variable compliance.. F/u  in this clinic noted severe HTN with SBP  220s mm Hg, ECG showed LVH and LAE, suggesting hypertensive heart disease, Amlodipine 10 mg was added, but no subsequent follow-up and the patient eventually discontinued all medicaiton.   More recently, he was started on metformin for elevated A1C (pre-diabetic).  Metformin self-discontinued due to GI discomfort. At f/u with PCP, Dr. Walker early 2024, nifedipine 60 mg daily was prescribed, but started by time of initial visit here. There was no snoring, daytime fatigue or witnessed apnea.  Since initial visit, he maintained nifedipine with good compliance and no side effects. He also saw Dr. Walker obtained CBC WNL, BMP showing glucose 120s.mg/dL and serum K 3.2 mmol/L in the absence of diuretic therapy and with normal renal function.   Labs 24 noted serum  K 2.7 mmol/L. He was asymptomatic and stared spironolactone. Serum aldosterone 26.5 ng/dL (normal <=23.2). Direct plasma renin was not done.  He was also noted to have mild KANDICE with SCr 1.4 mg/dL and likely secondary hyperparathyroidism with intact PTH 92 pg/mL (normal 15 - 65) and calcium 9.5 mg/dL (normal 8.4 - 10.5). Vitamin D 25 hydroxy level was ordered and patient nstructed to follow up with endocrinology as previously referred.   Since last discussion, he traveled to [facility] on vacation. He started spironolactone 24 (two doses as of this visit).  BP improved after accounting for white coat effect, initially 170s mm Hg, but repeat 151 mm Hg. Self-obtained BP using automated cuff 131/88 mm Hg on 24.  #Newly Diagnosed DM - A1C obtained here in clinic 8.8%. His outpatient PCP just recently started him on Farxiga.   #HLD - recent LDL was noted to be 171 mg/dL. Discussed with him today, amenable to starting atorvastatin 40 mg nightly given ASCVD risk 16.9%  Family History: mother with HTN, Father  from COVID at age of 70. No hx of MI, SCD, or stroke. 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 Lives with mom.  Exercise: lifts weights and does cardio, three times a week. Cardio in the form of bike. Does not take any supplements. Diet: mostly eats out, lots of Aakash food. Has made changes to eat more vegetables since initial visit.   Pharmacy: [facility].

## 2024-08-08 NOTE — REVIEW OF SYSTEMS
[Negative] : Psychiatric [Fever] : no fever [Headache] : no headache [Weight Gain (___ Lbs)] : no recent weight gain [Feeling Fatigued] : not feeling fatigued [Weight Loss (___ Lbs)] : no recent weight loss [SOB] : no shortness of breath [Chest Discomfort] : no chest discomfort [Palpitations] : no palpitations

## 2024-08-08 NOTE — DISCUSSION/SUMMARY
[FreeTextEntry1] :  WEIGHT GOALS:  Category				BMI range - kg/m2	BMI Prime Very severely underweight		less than 15		less than 0.60	 Severely underweight			from 15.0 to 16.0		from 0.60 to 0.64	 Underweight				from 16.0 to 18.5		from 0.64 to 0.74	 Normal (healthy weight)			from 18.5 to 25		from 0.74 to 1.0	 Overweight				from 25 to 30		from 1.0 to 1.2	 Obese Class I (Moderately obese)		from 30 to 35		from 1.2 to 1.4	 Obese Class II (Severely obese)		from 35 to 40		from 1.4 to 1.6	 Obese Class III (Very severely obese)	over 40			over 1.6	  Your current weight is 245 lbs. Given current weight and height 5'11", your calculated body mass index (BMI) is 34.2 kg/sqm. Normal BMI is 18.5-25 kg/sqm. Thus, current weight is in the obese class I category.   DIETARY SALT (SODIUM); DASH DIET AND BLOOD PRESSURE: To decrease the sodium in your diet:   Use fresh vegetables and foods as much as possible.  Avoid canned and processed foods. Cured meats such as pereira, ham, and sausages are high in salt.  Try using different herbs and spices in your cooking instead of salt.  In restaurants, avoid foods with sauces, cheese, and cured meats. Ask for low-sodium choices. To get more potassium in your diet, eat:  Bananas, fresh or dried apricots, peaches, citrus fruits, melons  Cauliflower, broccoli, tomatoes, carrots, raw spinach, beet greens, potatoes To get more magnesium in your diet, eat:  Whole grain foods, leafy green vegetables, dried fruits - Fish and seafood, poultry  To get more calcium in your diet, eat:  Nonfat milk, yogurt, and low-fat cheeses   [place] and sardines  Cooked dried beans  Broccoli, kale, and bok michael  Tofu or soybean curd DASH stands for "dietary approaches to stop hypertension." The DASH diet is low in total and saturated fat. It is rich in fruits, vegetables, and low-fat dairy foods. The diet allows you to get natural fiber, calcium, and magnesium from food. It prevents or lowers high blood pressure. It can also help lower cholesterol in your blood.  Don't change how you eat all at once. It's much more likely that you'll succeed if you make only one or two small changes at a time. Wait until those changes are a habit, then make a couple more changes. Some good starting steps include:  Add one serving of vegetables to your meals at lunch and dinner. This is an easy way to help you get more vegetables in your diet.  Have a piece of fruit as an afternoon or after-dinner snack. One glass of juice at breakfast is not enough fruit in your diet.  Use half your usual amount of butter, margarine, or salad dressing.  Buy nonfat salad dressing or nonfat sour cream. Follow this guide to select your menu of meals. The number of calories we want you to eat each day will tell you how many servings you can choose from each food group. Calories: 1600 2100 2600 3100  Servings Grains 6 7  10  12   Vegetables 	 4 4  5 6 Fruit 4 5 5 6 Dairy (low-fat) 2  3 3 3   Meat, poultry, fish  1  2 2   Nuts, seeds    1 Fats and sweets 1  2  3 4 Grains and grain products like breads and cereals provide energy, fiber and vitamins. Whole grains have more of these nutrients. One serving equals one of the following: Bagel, 1/2 medium; Barley, cooked 1/2 cup; Biscuit, country style 1 medium; Bread, whole wheat, white 1 slice; Cereals, cold or cooked, 1/2 cup; Cornbread, 1 medium piece; Crackers, xiomara, 2; Crackers, saltine, 4; Dinner roll, 1medium; English muffin, ; Hamburger bun, ; Muffin, 1 medium; Pancake, 1 medium; Pasta, 1/2 cup cooked; [place], 1/2 large or 1 small; Popcorn, 1 cup popped; Pretzels, 1 ounce; Rice, white, brown, or wild, 1/2 cup cooked; Tortillas, corn or flour, 1 medium; Waffle, 1 medium; Wheat germ, 1/4 cup;  Vegetables are rich sources of potassium, magnesium, and fiber. One serving is 1/2 cup of any of the following cooked vegetables: Asparagus, Beans (green, yellow), Beets, Broccoli, [place] Sprouts, Carrots, Cauliflower, [place]o, chicory, mustard and turnip (and other) greens, Corn, Kale, Lima beans, Mixed vegetables, Okra, Parsnips, Peas, green, Potatoes (1/2 medium or 1/2 cup mashed), Pumpkin, Rutabaga, Spaghetti or tomato sauce, Spinach, Squash (zucchini or yellow), Stewed tomatoes, Succotash, Sweet potatoes, Turnips, Yam  Raw vegetables: Carrots,1/2 cup; Celery, 1/2 cup; Lettuce (irene, loose-leaf, green-leaf), 1 cup; Peppers, 1/2 cup; Spinach, 1 cup; Tomato, 1/2 Fruits and fruit juices are important sources of potassium and magnesium. Fruits also contain fiber and are low in sodium and fat. One serving equals: Any fruit juice, # cup (6 ounces); Canned or frozen fruit,  cup (includes applesauce); Dried fruit,  cup;  Fresh fruit: Apple, 1 medium; Apricots, 2 medium; Banana, 1 medium; Berries, 1/2 cup; Melon, 1 wedge, or 1/2 cup; Cherries, 10; Grapefruit, 1/2; Grapes, 15; Kiwi, 1 medium; Iván, 1/2 small; Nectarine, 1 medium; Orange, 1 medium; Peach, 1 medium; Pear, 1 medium; Pineapple, 1/2 cup; Plums, 2 medium; Tangerine, 1 large Dairy foods provide protein and calcium. Use low-fat or nonfat dairy products to cut down on fat. One serving equals: Skim milk, 1 cup (8 ounces); 1% low fat milk, 1 cup (8 ounces); 2% low fat milk, 1 cup (8 ounces) nonfat dry milk powder (1/3 cup); Low-fat cottage cheese, 1 cup (8 ounces); Parmesan cheese, 1 tablespoon; Mozzarella cheese, part skim, 1/4 cup (1 ounce); Low-fat cheddar cheese, 11/2 ounces; Ricotta cheese, part skim milk or nonfat, 1/4 cup (11/2 ounces); Other low fat or nonfat cheeses (11/2 oz.); Low-fat or nonfat yogurt, fruit-flavored or plain, 1 cup (8 ounces) Low-fat or nonfat frozen yogurt, 1/2 cup (4 ounces); Note: People who can't digest dairy products can try taking lactase enzyme pills or drops (available at drug and grocery stores) when they eat dairy. There is also milk available with the enzyme already added. Or you can buy lactose-free milk. Meat, poultry, and fish are good sources of protein and magnesium. One serving equals: Lean meat including beef, veal, or pork, 3 ounces cooked; Skinless, white meat poultry including turkey, chicken, 3 ounces; Fish and shellfish, 3 ounces cooked; Low-fat luncheon meats, 1 ounce; Egg, 1 medium; Tofu, 3 ounces Note: Three ounces of cooked meat is about the size of a deck of cards. Nuts, seeds, and legumes are rich sources of energy, magnesium, potassium, protein and fiber. Nuts and seeds are also high in fat, so portions should be small. Almonds, 1/3 cup; Beans such as kidney, hidalgo, and navy, 1/2 cup cooked; Chickpeas and lentils, 1/2 cup cooked; Cashews, 1/3 cup; Filberts, 1/3 cup; Mixed nuts, 1/3 cup; Peanut butter, 2 tablespoons; Peanuts, 1/3 cup; Sesame seeds, 2 tablespoons; Sunflower seeds, 2 tablespoons; Tofu, regular, 3 ounces; Walnuts, 1/3 cup  Following the above diet will give you about 27% fat in your diet. The goal is to have 30% or less of the calories you eat each day be from fat. To meet that goal, do not eat more than 2-3 servings daily of added fat. Also try to limit sweets. One serving equals: Butter or margarine, 1 teaspoon; Mayonnaise, 1 teaspoon; Low-fat mayonnaise, 1 tablespoon; Salad dressing, 1 tablespoon; Low-fat salad dressing, 2 tablespoons; Oil, 1 teaspoon (use olive, canola, safflower, or other vegetable oils); Candy, hard, 3 pieces; Jelly or jam, 1 tablespoon); Jell-O, 1/2 cup; Jelly beans, 1/2 ounce; Maple syrup, 1 tablespoon; Popsicle, 1; Sherbet or nonfat or low-fat frozen yogurt, 1/2 cup; Sugar, 1 tablespoon; Sugared lemonade or fruit punch, 1 cup (8 ounces); Note: Try diet fruit-flavored gelatin or frozen, canned, or fresh fruit for dessert.  Small amounts of alcohol may have benefits to the heart and blood pressure. However, excess use of alcohol can cause damage to the brain, liver and other organs. It can lead to high blood pressure. Drinking more than two drinks (15 ml)  every day can raise your blood pressure. 15 ml of alcohol equals:  - one 12-ounce bottle of beer  - a half glass (5 ounces) of wine  - 1 ounce (one shot) of 100 proof hard liquor   GLYCEMIC INDEX: Foods can be measured by how much they are likely to raise blood sugar. This is called the glycemic index. We want you to eat mostly foods that have a low glycemic index.  Fruit: choose cherries, grapefruit, prunes, dried apricots, apples, peaches, pears, blueberries, strawberries, oranges, and grapes.   Breads and other starches: Choose pumpernickel, rye, sourdough, or stone-ground whole wheat bread. For cereal, choose bran flakes, oat flakes, and oatmeal. For rice, use long-grained white rice. Most pasta is fairly low on the glycemic index; whole wheat or egg pasta is the lowest. Choose new or sweet potatoes and yams.   Dairy products: Dairy tends to be fairly low on the glycemic index because of the protein and fat in it. Premium ice cream is lower on the glycemic index than low-fat ice cream.   Salty snacks: Hummus, peanuts, walnuts, and cashews are all good choices.   Sweets: Most sweets are high on the glycemic index, so make them special treats only. Ones that have protein and fat in them tend to be a bit lower. Milk chocolate or peanut candies are good choices. Pound and sponge cakes are lower on the glycemic index than other types of cakes. For cookies, choose peanut butter, chocolate chip, butter, and oatmeal cookies. For a breakfast treat, choose scones or oatmeal muffins.  Beans: Choose dairela, chickpeas (garbanzo beans), lentils, split peas, lima beans, and kidney beans.  Meat and vegetables are low on the glycemic index. Soups and stews made with meat or vegetables are also good.    A diabetic diet is an ideal way to eat. Because diabetics do not digest foods normally, it is especially important to eat in a way that is not stressful to the body. A diabetic diet is low in fat and sugars, and contains just the right amount of calories for your needs. It is important that eating be spread evenly throughout the day. It is better to have 5 to 6 small meals than 2 to 3 large ones. Avoid the following on a low-sugar diet:  candy  chocolates  cookies  baked goods, such as pastries  soda pop  juices, especially those that have sugar added  ice cream, ice milk, frozen yogurt, and sherbet After 48 hours on a very low-carbohydrate diet, the body uses up its stored carbohydrates (glycogen) and starts burning fat for fuel. However, a diet that contains too much saturated fat and red meat may increase your risk of heart disease or cancer. If you want to try this type of diet, we recommend doing the following: - Limit red meat, butter, cream, eggs and hard cheeses. These contain saturated fats or cholesterol. Fish, skinless chicken or turkey, and cottage cheese are better choices. Beans, soy products such as tofu, nuts, and nut butters are also good sources of protein. Make your portions small. One serving equals 3 ounces of chicken or a half-cup of cottage cheese. Try to limit your fat intake to 30 percent of your total diet. - Not all carbohydrates are bad for you. It's wise to limit how much you eat of foods made with white flour, like bread, crackers, cookies, and cake. But it's a good idea to eat plenty of green vegetables and whole grains like oats and brown rice. Choose whole-wheat or whole-grain bread and pasta instead of white bread and pasta. Eat at least four servings of whole grains and five of fruits and vegetables every day. - As much as possible, cut out sugar and sweetened foods. When you want something sweet, eat fruit. A bowl of fruit with a small scoop of non-fat vanilla ice cream on top is a great dessert and gives you some protein too. - Remember that to lose weight, you have to burn more calories than you take in. Exercise will help you burn more calories. Talk to us if you have not been exercising regularly. We can help you find an exercise plan that works for you.  Good eye care is an important part of your overall treatment. Diabetes can lead to eye problems and even blindness. Seeing an eye doctor regularly helps prevent serious eye problems. It also helps us know if your diabetes needs to be better controlled.  Do the following every day: - Keep your feet clean and dry. Wash them every day with a mild soap. Dry carefully, especially between the toes. Use a soft towel and don't rub.  - Wear clean, soft stockings. Avoid elastic, stretch socks. Don't allow your socks to bunch up inside your shoe. - Wear soft, comfortable shoes that fit you well. Do not wear shoes that are too tight or that rub your feet. - Check the bath water with your hand before you put your feet in. Be sure it is not too hot. - Treat dry skin with a lotion everywhere except between your toes. - Cut your toenails straight across.  - Do not treat ingrown toe nails, corns, or warts yourself. Let us help you. - Don't wear sandals. - Don't wear shoes without socks. - Don't soak your feet in water. - Don't use a heating pad or other heating devices on your feet. - Never go barefoot. Watch for: - Swollen or red areas (signs of possible infection) - Cuts or breaks in the skin - Cold or pale areas (sign of possible poor circulation) - Very warm areas (sign of possible infection) - A sore on your foot - A red, tender toe Call us if you see any of these problems. Early treatment of foot problems in diabetes is very important.  MEDITERRANEAN DIET: The Mediterranean diet does not limit calories. Instead, it provides guidelines for what foods you should eat more of and what foods you should eat less of.  Every day:  - Eat several servings of plant foods. These include fruits and vegetables, potatoes, breads and whole grains, beans, nuts, and seeds. As much as you can, eat fresh rather than canned or frozen foods.  - Use olive oil for cooking and salad dressings. You can have up to 35% of your calories from fats, including olive and other vegetable oils. However, limit the amount you eat of saturated fats like butter, margarine, lard, and animal fat. They should add up to no more than 8 percent of your total calories. Include "partially hydrogenated" oils in your saturated fat limit.  - Every day, have a small amount of cheese and yogurt. Look for low-fat and non-fat types. Think of cheese as a condiment like salt or pepper, not a main dish. For example, you can sprinkle a little Parmesan cheese on pasta or put a little cheese in a salad. Cheese contains saturated fat. Mozzarella cheese is much lower in fat than most types of cheese.  - If alcohol is not a problem for you, have one (for women) or two (for men) glasses of red wine with dinner. If alcohol is not a good idea for you, try adding grape juice to your diet instead. It gives you the same heart-protecting benefits of wine without alcohol. It is very sweet, so you might use it for your "dessert."  Every week:  - Eat some fish and poultry. Do not eat chicken or turkey skin.  - Eat no more than 4 eggs a week, including eggs used in cooking. Egg yolks contain saturated fat.  - Eat no more than 2 or 3 sweets or desserts that contain lots of sugar or honey. Try fresh fruit or sweets made with fruit concentrate instead.  Every month:  - Eat no more than 12 to 16 ounces of red meat. Lean meat is preferable.

## 2024-08-08 NOTE — DISCUSSION/SUMMARY
[FreeTextEntry1] :  WEIGHT GOALS:  Category				BMI range - kg/m2	BMI Prime Very severely underweight		less than 15		less than 0.60	 Severely underweight			from 15.0 to 16.0		from 0.60 to 0.64	 Underweight				from 16.0 to 18.5		from 0.64 to 0.74	 Normal (healthy weight)			from 18.5 to 25		from 0.74 to 1.0	 Overweight				from 25 to 30		from 1.0 to 1.2	 Obese Class I (Moderately obese)		from 30 to 35		from 1.2 to 1.4	 Obese Class II (Severely obese)		from 35 to 40		from 1.4 to 1.6	 Obese Class III (Very severely obese)	over 40			over 1.6	  Your current weight is 245 lbs. Given current weight and height 5'11", your calculated body mass index (BMI) is 34.2 kg/sqm. Normal BMI is 18.5-25 kg/sqm. Thus, current weight is in the obese class I category.   DIETARY SALT (SODIUM); DASH DIET AND BLOOD PRESSURE: To decrease the sodium in your diet:   Use fresh vegetables and foods as much as possible.  Avoid canned and processed foods. Cured meats such as pereira, ham, and sausages are high in salt.  Try using different herbs and spices in your cooking instead of salt.  In restaurants, avoid foods with sauces, cheese, and cured meats. Ask for low-sodium choices. To get more potassium in your diet, eat:  Bananas, fresh or dried apricots, peaches, citrus fruits, melons  Cauliflower, broccoli, tomatoes, carrots, raw spinach, beet greens, potatoes To get more magnesium in your diet, eat:  Whole grain foods, leafy green vegetables, dried fruits - Fish and seafood, poultry  To get more calcium in your diet, eat:  Nonfat milk, yogurt, and low-fat cheeses   Murtaugh and sardines  Cooked dried beans  Broccoli, kale, and bok michael  Tofu or soybean curd DASH stands for "dietary approaches to stop hypertension." The DASH diet is low in total and saturated fat. It is rich in fruits, vegetables, and low-fat dairy foods. The diet allows you to get natural fiber, calcium, and magnesium from food. It prevents or lowers high blood pressure. It can also help lower cholesterol in your blood.  Don't change how you eat all at once. It's much more likely that you'll succeed if you make only one or two small changes at a time. Wait until those changes are a habit, then make a couple more changes. Some good starting steps include:  Add one serving of vegetables to your meals at lunch and dinner. This is an easy way to help you get more vegetables in your diet.  Have a piece of fruit as an afternoon or after-dinner snack. One glass of juice at breakfast is not enough fruit in your diet.  Use half your usual amount of butter, margarine, or salad dressing.  Buy nonfat salad dressing or nonfat sour cream. Follow this guide to select your menu of meals. The number of calories we want you to eat each day will tell you how many servings you can choose from each food group. Calories: 1600 2100 2600 3100  Servings Grains 6 7  10  12   Vegetables 	 4 4  5 6 Fruit 4 5 5 6 Dairy (low-fat) 2  3 3 3   Meat, poultry, fish  1  2 2   Nuts, seeds    1 Fats and sweets 1  2  3 4 Grains and grain products like breads and cereals provide energy, fiber and vitamins. Whole grains have more of these nutrients. One serving equals one of the following: Bagel, 1/2 medium; Barley, cooked 1/2 cup; Biscuit, country style 1 medium; Bread, whole wheat, white 1 slice; Cereals, cold or cooked, 1/2 cup; Cornbread, 1 medium piece; Crackers, xiomara, 2; Crackers, saltine, 4; Dinner roll, 1medium; English muffin, ; Hamburger bun, ; Muffin, 1 medium; Pancake, 1 medium; Pasta, 1/2 cup cooked; Dominga, 1/2 large or 1 small; Popcorn, 1 cup popped; Pretzels, 1 ounce; Rice, white, brown, or wild, 1/2 cup cooked; Tortillas, corn or flour, 1 medium; Waffle, 1 medium; Wheat germ, 1/4 cup;  Vegetables are rich sources of potassium, magnesium, and fiber. One serving is 1/2 cup of any of the following cooked vegetables: Asparagus, Beans (green, yellow), Beets, Broccoli, [place] Sprouts, Carrots, Cauliflower, Temo, chicory, mustard and turnip (and other) greens, Corn, Kale, Lima beans, Mixed vegetables, Okra, Parsnips, Peas, green, Potatoes (1/2 medium or 1/2 cup mashed), Pumpkin, Rutabaga, Spaghetti or tomato sauce, Spinach, Squash (zucchini or yellow), Stewed tomatoes, Succotash, Sweet potatoes, Turnips, Yam  Raw vegetables: Carrots,1/2 cup; Celery, 1/2 cup; Lettuce (irene, loose-leaf, green-leaf), 1 cup; Peppers, 1/2 cup; Spinach, 1 cup; Tomato, 1/2 Fruits and fruit juices are important sources of potassium and magnesium. Fruits also contain fiber and are low in sodium and fat. One serving equals: Any fruit juice, # cup (6 ounces); Canned or frozen fruit,  cup (includes applesauce); Dried fruit,  cup;  Fresh fruit: Apple, 1 medium; Apricots, 2 medium; Banana, 1 medium; Berries, 1/2 cup; Melon, 1 wedge, or 1/2 cup; Cherries, 10; Grapefruit, 1/2; Grapes, 15; Kiwi, 1 medium; Iván, 1/2 small; Nectarine, 1 medium; Orange, 1 medium; Peach, 1 medium; Pear, 1 medium; Pineapple, 1/2 cup; Plums, 2 medium; Tangerine, 1 large Dairy foods provide protein and calcium. Use low-fat or nonfat dairy products to cut down on fat. One serving equals: Skim milk, 1 cup (8 ounces); 1% low fat milk, 1 cup (8 ounces); 2% low fat milk, 1 cup (8 ounces) nonfat dry milk powder (1/3 cup); Low-fat cottage cheese, 1 cup (8 ounces); Parmesan cheese, 1 tablespoon; Mozzarella cheese, part skim, 1/4 cup (1 ounce); Low-fat cheddar cheese, 11/2 ounces; Ricotta cheese, part skim milk or nonfat, 1/4 cup (11/2 ounces); Other low fat or nonfat cheeses (11/2 oz.); Low-fat or nonfat yogurt, fruit-flavored or plain, 1 cup (8 ounces) Low-fat or nonfat frozen yogurt, 1/2 cup (4 ounces); Note: People who can't digest dairy products can try taking lactase enzyme pills or drops (available at drug and grocery stores) when they eat dairy. There is also milk available with the enzyme already added. Or you can buy lactose-free milk. Meat, poultry, and fish are good sources of protein and magnesium. One serving equals: Lean meat including beef, veal, or pork, 3 ounces cooked; Skinless, white meat poultry including turkey, chicken, 3 ounces; Fish and shellfish, 3 ounces cooked; Low-fat luncheon meats, 1 ounce; Egg, 1 medium; Tofu, 3 ounces Note: Three ounces of cooked meat is about the size of a deck of cards. Nuts, seeds, and legumes are rich sources of energy, magnesium, potassium, protein and fiber. Nuts and seeds are also high in fat, so portions should be small. Almonds, 1/3 cup; Beans such as kidney, hidalgo, and navy, 1/2 cup cooked; Chickpeas and lentils, 1/2 cup cooked; Cashews, 1/3 cup; Filberts, 1/3 cup; Mixed nuts, 1/3 cup; Peanut butter, 2 tablespoons; Peanuts, 1/3 cup; Sesame seeds, 2 tablespoons; Sunflower seeds, 2 tablespoons; Tofu, regular, 3 ounces; Walnuts, 1/3 cup  Following the above diet will give you about 27% fat in your diet. The goal is to have 30% or less of the calories you eat each day be from fat. To meet that goal, do not eat more than 2-3 servings daily of added fat. Also try to limit sweets. One serving equals: Butter or margarine, 1 teaspoon; Mayonnaise, 1 teaspoon; Low-fat mayonnaise, 1 tablespoon; Salad dressing, 1 tablespoon; Low-fat salad dressing, 2 tablespoons; Oil, 1 teaspoon (use olive, canola, safflower, or other vegetable oils); Candy, hard, 3 pieces; Jelly or jam, 1 tablespoon); Jell-O, 1/2 cup; Jelly beans, 1/2 ounce; Maple syrup, 1 tablespoon; Popsicle, 1; Sherbet or nonfat or low-fat frozen yogurt, 1/2 cup; Sugar, 1 tablespoon; Sugared lemonade or fruit punch, 1 cup (8 ounces); Note: Try diet fruit-flavored gelatin or frozen, canned, or fresh fruit for dessert.  Small amounts of alcohol may have benefits to the heart and blood pressure. However, excess use of alcohol can cause damage to the brain, liver and other organs. It can lead to high blood pressure. Drinking more than two drinks (15 ml)  every day can raise your blood pressure. 15 ml of alcohol equals:  - one 12-ounce bottle of beer  - a half glass (5 ounces) of wine  - 1 ounce (one shot) of 100 proof hard liquor   GLYCEMIC INDEX: Foods can be measured by how much they are likely to raise blood sugar. This is called the glycemic index. We want you to eat mostly foods that have a low glycemic index.  Fruit: choose cherries, grapefruit, prunes, dried apricots, apples, peaches, pears, blueberries, strawberries, oranges, and grapes.   Breads and other starches: Choose pumpernickel, rye, sourdough, or stone-ground whole wheat bread. For cereal, choose bran flakes, oat flakes, and oatmeal. For rice, use long-grained white rice. Most pasta is fairly low on the glycemic index; whole wheat or egg pasta is the lowest. Choose new or sweet potatoes and yams.   Dairy products: Dairy tends to be fairly low on the glycemic index because of the protein and fat in it. Premium ice cream is lower on the glycemic index than low-fat ice cream.   Salty snacks: Hummus, peanuts, walnuts, and cashews are all good choices.   Sweets: Most sweets are high on the glycemic index, so make them special treats only. Ones that have protein and fat in them tend to be a bit lower. Milk chocolate or peanut candies are good choices. Pound and sponge cakes are lower on the glycemic index than other types of cakes. For cookies, choose peanut butter, chocolate chip, butter, and oatmeal cookies. For a breakfast treat, choose scones or oatmeal muffins.  Beans: Choose dariela, chickpeas (garbanzo beans), lentils, split peas, lima beans, and kidney beans.  Meat and vegetables are low on the glycemic index. Soups and stews made with meat or vegetables are also good.    A diabetic diet is an ideal way to eat. Because diabetics do not digest foods normally, it is especially important to eat in a way that is not stressful to the body. A diabetic diet is low in fat and sugars, and contains just the right amount of calories for your needs. It is important that eating be spread evenly throughout the day. It is better to have 5 to 6 small meals than 2 to 3 large ones. Avoid the following on a low-sugar diet:  candy  chocolates  cookies  baked goods, such as pastries  soda pop  juices, especially those that have sugar added  ice cream, ice milk, frozen yogurt, and sherbet After 48 hours on a very low-carbohydrate diet, the body uses up its stored carbohydrates (glycogen) and starts burning fat for fuel. However, a diet that contains too much saturated fat and red meat may increase your risk of heart disease or cancer. If you want to try this type of diet, we recommend doing the following: - Limit red meat, butter, cream, eggs and hard cheeses. These contain saturated fats or cholesterol. Fish, skinless chicken or turkey, and cottage cheese are better choices. Beans, soy products such as tofu, nuts, and nut butters are also good sources of protein. Make your portions small. One serving equals 3 ounces of chicken or a half-cup of cottage cheese. Try to limit your fat intake to 30 percent of your total diet. - Not all carbohydrates are bad for you. It's wise to limit how much you eat of foods made with white flour, like bread, crackers, cookies, and cake. But it's a good idea to eat plenty of green vegetables and whole grains like oats and brown rice. Choose whole-wheat or whole-grain bread and pasta instead of white bread and pasta. Eat at least four servings of whole grains and five of fruits and vegetables every day. - As much as possible, cut out sugar and sweetened foods. When you want something sweet, eat fruit. A bowl of fruit with a small scoop of non-fat vanilla ice cream on top is a great dessert and gives you some protein too. - Remember that to lose weight, you have to burn more calories than you take in. Exercise will help you burn more calories. Talk to us if you have not been exercising regularly. We can help you find an exercise plan that works for you.  Good eye care is an important part of your overall treatment. Diabetes can lead to eye problems and even blindness. Seeing an eye doctor regularly helps prevent serious eye problems. It also helps us know if your diabetes needs to be better controlled.  Do the following every day: - Keep your feet clean and dry. Wash them every day with a mild soap. Dry carefully, especially between the toes. Use a soft towel and don't rub.  - Wear clean, soft stockings. Avoid elastic, stretch socks. Don't allow your socks to bunch up inside your shoe. - Wear soft, comfortable shoes that fit you well. Do not wear shoes that are too tight or that rub your feet. - Check the bath water with your hand before you put your feet in. Be sure it is not too hot. - Treat dry skin with a lotion everywhere except between your toes. - Cut your toenails straight across.  - Do not treat ingrown toe nails, corns, or warts yourself. Let us help you. - Don't wear sandals. - Don't wear shoes without socks. - Don't soak your feet in water. - Don't use a heating pad or other heating devices on your feet. - Never go barefoot. Watch for: - Swollen or red areas (signs of possible infection) - Cuts or breaks in the skin - Cold or pale areas (sign of possible poor circulation) - Very warm areas (sign of possible infection) - A sore on your foot - A red, tender toe Call us if you see any of these problems. Early treatment of foot problems in diabetes is very important.  MEDITERRANEAN DIET: The Mediterranean diet does not limit calories. Instead, it provides guidelines for what foods you should eat more of and what foods you should eat less of.  Every day:  - Eat several servings of plant foods. These include fruits and vegetables, potatoes, breads and whole grains, beans, nuts, and seeds. As much as you can, eat fresh rather than canned or frozen foods.  - Use olive oil for cooking and salad dressings. You can have up to 35% of your calories from fats, including olive and other vegetable oils. However, limit the amount you eat of saturated fats like butter, margarine, lard, and animal fat. They should add up to no more than 8 percent of your total calories. Include "partially hydrogenated" oils in your saturated fat limit.  - Every day, have a small amount of cheese and yogurt. Look for low-fat and non-fat types. Think of cheese as a condiment like salt or pepper, not a main dish. For example, you can sprinkle a little Parmesan cheese on pasta or put a little cheese in a salad. Cheese contains saturated fat. Mozzarella cheese is much lower in fat than most types of cheese.  - If alcohol is not a problem for you, have one (for women) or two (for men) glasses of red wine with dinner. If alcohol is not a good idea for you, try adding grape juice to your diet instead. It gives you the same heart-protecting benefits of wine without alcohol. It is very sweet, so you might use it for your "dessert."  Every week:  - Eat some fish and poultry. Do not eat chicken or turkey skin.  - Eat no more than 4 eggs a week, including eggs used in cooking. Egg yolks contain saturated fat.  - Eat no more than 2 or 3 sweets or desserts that contain lots of sugar or honey. Try fresh fruit or sweets made with fruit concentrate instead.  Every month:  - Eat no more than 12 to 16 ounces of red meat. Lean meat is preferable.

## 2024-08-21 ENCOUNTER — APPOINTMENT (OUTPATIENT)
Dept: CARDIOLOGY | Facility: HOSPITAL | Age: 44
End: 2024-08-21

## 2024-08-27 ENCOUNTER — APPOINTMENT (OUTPATIENT)
Dept: CT IMAGING | Facility: IMAGING CENTER | Age: 44
End: 2024-08-27
Payer: MEDICAID

## 2024-08-27 ENCOUNTER — OUTPATIENT (OUTPATIENT)
Dept: OUTPATIENT SERVICES | Facility: HOSPITAL | Age: 44
LOS: 1 days | End: 2024-08-27
Payer: MEDICAID

## 2024-08-27 DIAGNOSIS — I10 ESSENTIAL (PRIMARY) HYPERTENSION: ICD-10-CM

## 2024-08-27 PROCEDURE — 74170 CT ABD WO CNTRST FLWD CNTRST: CPT

## 2024-08-27 PROCEDURE — 74170 CT ABD WO CNTRST FLWD CNTRST: CPT | Mod: 26

## 2024-10-21 ENCOUNTER — RX RENEWAL (OUTPATIENT)
Age: 44
End: 2024-10-21

## 2025-04-03 ENCOUNTER — APPOINTMENT (OUTPATIENT)
Dept: CARDIOLOGY | Facility: CLINIC | Age: 45
End: 2025-04-03

## 2025-05-01 ENCOUNTER — APPOINTMENT (OUTPATIENT)
Dept: CARDIOLOGY | Facility: CLINIC | Age: 45
End: 2025-05-01